# Patient Record
Sex: FEMALE | Race: OTHER | Employment: UNEMPLOYED | URBAN - METROPOLITAN AREA
[De-identification: names, ages, dates, MRNs, and addresses within clinical notes are randomized per-mention and may not be internally consistent; named-entity substitution may affect disease eponyms.]

---
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
## 2024-01-09 ENCOUNTER — TELEPHONE (OUTPATIENT)
Age: 19
End: 2024-01-09

## 2024-01-09 ENCOUNTER — INITIAL PRENATAL (OUTPATIENT)
Age: 19
End: 2024-01-09

## 2024-01-09 VITALS
DIASTOLIC BLOOD PRESSURE: 71 MMHG | SYSTOLIC BLOOD PRESSURE: 108 MMHG | TEMPERATURE: 98.4 F | HEIGHT: 67 IN | HEART RATE: 72 BPM | OXYGEN SATURATION: 100 % | BODY MASS INDEX: 24.33 KG/M2 | RESPIRATION RATE: 17 BRPM | WEIGHT: 155 LBS

## 2024-01-09 DIAGNOSIS — Z23 NEED FOR INFLUENZA VACCINATION: ICD-10-CM

## 2024-01-09 DIAGNOSIS — O21.0 HYPEREMESIS GRAVIDARUM: ICD-10-CM

## 2024-01-09 DIAGNOSIS — Z34.01 PRENATAL CARE, FIRST PREGNANCY IN FIRST TRIMESTER: Primary | ICD-10-CM

## 2024-01-09 DIAGNOSIS — O30.001 TWIN GESTATION IN FIRST TRIMESTER, UNSPECIFIED MULTIPLE GESTATION TYPE: ICD-10-CM

## 2024-01-09 PROCEDURE — 90674 CCIIV4 VAC NO PRSV 0.5 ML IM: CPT

## 2024-01-09 PROCEDURE — PBSHW INFLUENZA, FLUCELVAX, (AGE 6 MO+), IM, PF, 0.5 ML

## 2024-01-09 PROCEDURE — 0500F INITIAL PRENATAL CARE VISIT: CPT

## 2024-01-09 PROCEDURE — PBSHW PBB SHADOW CHARGE

## 2024-01-09 RX ORDER — DOXYLAMINE SUCCINATE AND PYRIDOXINE HYDROCHLORIDE, DELAYED RELEASE TABLETS 10 MG/10 MG 10; 10 MG/1; MG/1
10 TABLET, DELAYED RELEASE ORAL NIGHTLY
Qty: 60 TABLET | Refills: 0 | Status: SHIPPED | OUTPATIENT
Start: 2024-01-09 | End: 2024-01-09

## 2024-01-09 RX ORDER — ASPIRIN 81 MG/1
81 TABLET ORAL DAILY
Qty: 90 TABLET | Refills: 2 | Status: SHIPPED | OUTPATIENT
Start: 2024-01-09

## 2024-01-09 RX ORDER — PYRIDOXINE HCL (VITAMIN B6) 50 MG
25 TABLET ORAL DAILY
Qty: 30 TABLET | Refills: 1 | Status: SHIPPED | OUTPATIENT
Start: 2024-01-09 | End: 2025-01-08

## 2024-01-09 ASSESSMENT — PATIENT HEALTH QUESTIONNAIRE - PHQ9
SUM OF ALL RESPONSES TO PHQ QUESTIONS 1-9: 1
1. LITTLE INTEREST OR PLEASURE IN DOING THINGS: 1
SUM OF ALL RESPONSES TO PHQ QUESTIONS 1-9: 1
SUM OF ALL RESPONSES TO PHQ QUESTIONS 1-9: 1
SUM OF ALL RESPONSES TO PHQ9 QUESTIONS 1 & 2: 1
SUM OF ALL RESPONSES TO PHQ QUESTIONS 1-9: 1
2. FEELING DOWN, DEPRESSED OR HOPELESS: 0

## 2024-01-09 NOTE — PROGRESS NOTES
I reviewed with the resident the medical history and the resident's findings on the physical examination.  I discussed with the resident the patient's diagnosis and concur with the plan, with the following additions:     18 y.o.  at 15w2d by 11ws here for AUGUSTINE    BP Readings from Last 3 Encounters:   24 108/71   11/15/23 115/76   10/24/23 120/79       Wt Readings from Last 10 Encounters:   24 70.3 kg (155 lb) (84 %, Z= 0.99)*   11/15/23 77.1 kg (170 lb) (92 %, Z= 1.38)*   10/24/23 68 kg (150 lb) (80 %, Z= 0.85)*     * Growth percentiles are based on CDC (Girls, 2-20 Years) data.     POC Limited Obstetric US:  Presentation/Position: Breech, Breech  Viability: confirmed by FHT A - 153 BPM; B - 145 bpm  Gestation: twins  Placenta: anterior. Cannot exclude monochorionic. T-sign not clearly visualized.  Fluid: Adequate         #AUGUSTINE:   - Labs reviewed - hgb 13.3; Rh+ and ab screen neg; other labs pending.    - Last Pap: NI, too young  - Sonos reviewed.  TVUS showing twin IUP. Dates changed due to uncertain LMP  - Immunizations: flu today; Tdap at 28 weeks; Covid x2; Hep B immunity pending  - Indications for Aspirin: twin gestation  - Taking PNV.  - Anticipated mode of delivery: TBD  - Contraception plan: TBD    #Twin Gestation   - POC US today does not rule out monochoionicity. T-sign was not clearly seen.  - Scheduled with MFM for anatomy + consultation on  at 1:00pm (20w2d)  - if confirmed di-di, will need APT at 36 weeks and delivery from 38-38 6/7 weeks.    #Teen  - UDS collected today  - GC chalmydia neg. Other STI screening collected today, repeat in 3rd tri.  - Message sent to  for assistance.    Today: IOB labs. Start ASA. Scheduled w MFM.  To be done at next visit: review labs, MFM sono.        Follow up in 4 weeks.      30 minutes were spent on the day of this encounter both with the patient and in related activities including chart review, care coordination and counseling.

## 2024-01-09 NOTE — PROGRESS NOTES
St and last name and .    Session # [id number]   # [id number]    Chief Complaint   Patient presents with    Initial Prenatal Visit     . 20w 3d today. No bleeding or LOF.  - FM.        Vitals:    24 1406   BP: 108/71   Pulse: 72   Resp: 17   Temp: 98.4 °F (36.9 °C)   TempSrc: Temporal   SpO2: 100%   Weight: 70.3 kg (155 lb)   Height: 1.711 m (5' 7.35\")        1. Have you been to the ER, urgent care clinic since your last visit?  Hospitalized since your last visit?No    2. Have you seen or consulted any other health care providers outside of the [facility] since your last visit?  Include any pap smears or colon screening. No

## 2024-01-09 NOTE — PROGRESS NOTES
[address] [place] [address]   Office [phone], Fax [phone]  Subjective:   Valeri Kwon is a 18 y.o.  who is being seen today for her first obstetrical visit.    HPI: No bleeding/fluid discharge. White vaginal discharge. Having nausea/vomiting for past 3 months daily, 2-3 times per day. Reports only keeping down plantains and occasionally soups. Vomits with water intake as well. Remainder of ROS negative. Denies chest pain, fever/chills, abdominal pain, shortness of breath, headache, change in urine/bowel movement.    OB History  . No prior pregnancies, miscarriage, or abortions.  Confirmed viable IUP by TVUS on 23. Less than 7 day discrepancy.   She is at EGA 15w 2d by TVUS . Previously stated that LMP was on 23. Today states LMP was 23. GAEL 2024 by US.     First pos pregnancy test: 10/25/23 in emergency department.  This is a planned pregnancy. FOB is involved and present with the patient.  GYN Hx: Denies prior STI (g/c, herpes). Denies HIV.  See flow sheet for other OB history.    PMHx:   - No prior diagnosis. No previous hospitalizations. No psych hx.  - No regular medications other than prenatal vitamin. No allergies.   FHx: unsure.  SHx:  - Home: Apartment  - Work: No  - Financial stability: Supported by partner and family.  - Social support: Yes, good as above.  - Mood: Sentimental, Happy about pregnancy  - Smoking: Yes, vape; for 4 months, stopped in 2023. No tobacco or marijuana use.  - Alcohol use: Yes; started at age 15 and stopped in 2023.   - Drug use: No.    History of GDM or DM? No prior pregnancy.   History of GHTN or CHTN? No prior pregnancy.   History of pre-eclampsia? No prior pregnancy.   Taking prenatal vitamins? Yes, since 2023.    Allergies- reviewed:   No Known Allergies    Medications- reviewed:   Current Outpatient Medications   Medication Sig    Prenatal Vit-Fe Fumarate-FA (PRENATAL PO) Take by mouth

## 2024-01-09 NOTE — TELEPHONE ENCOUNTER
Received a call from Dr Allen to have patient scheduled for an ultrasound. He wanted to know if she should be seen sooner than the 20 wks bc she has twins or could she wait until her 20 wks. I got with the nurse and she stated that she would be fine coimg in at 20 weeks. The patient's anatomy scan is scheduled for 02/13 @ 1:00 pm. Dr Allen then called back and wanted the pt seen at 16 wks so that it could be determined if the pt is carrying di di or mono di twins. I called the pt via , spoke with the , who is on file for consent to schedule Friday's appointment. The pt is currently scheduled for Friday, January 12th at 1:45 pm at [facility].

## 2024-01-10 LAB
APPEARANCE UR: ABNORMAL
BACTERIA URNS QL MICRO: ABNORMAL /HPF
BILIRUB UR QL CFM: NEGATIVE
CAOX CRY URNS QL MICRO: ABNORMAL
COLOR UR: ABNORMAL
EPITH CASTS URNS QL MICRO: ABNORMAL /LPF
GLUCOSE UR STRIP.AUTO-MCNC: 100 MG/DL
HBV SURFACE AB SER QL: REACTIVE
HBV SURFACE AB SER-ACNC: 21.63 MIU/ML
HBV SURFACE AG SER QL: 0.24 INDEX
HBV SURFACE AG SER QL: NEGATIVE
HGB UR QL STRIP: NEGATIVE
HIV 1+2 AB+HIV1 P24 AG SERPL QL IA: NONREACTIVE
HIV 1/2 RESULT COMMENT: NORMAL
HYALINE CASTS URNS QL MICRO: >20 /LPF (ref 0–5)
KETONES UR QL STRIP.AUTO: ABNORMAL MG/DL
LEUKOCYTE ESTERASE UR QL STRIP.AUTO: ABNORMAL
NITRITE UR QL STRIP.AUTO: NEGATIVE
PH UR STRIP: 5.5 (ref 5–8)
PROT UR STRIP-MCNC: 100 MG/DL
RBC #/AREA URNS HPF: ABNORMAL /HPF (ref 0–5)
RPR SER QL: NONREACTIVE
RUBV IGG SERPL IA-ACNC: NORMAL IU/ML
SP GR UR REFRACTOMETRY: 1.03 (ref 1–1.03)
TSH SERPL DL<=0.05 MIU/L-ACNC: 0.34 UIU/ML (ref 0.36–3.74)
UROBILINOGEN UR QL STRIP.AUTO: 0.2 EU/DL (ref 0.2–1)
WBC URNS QL MICRO: ABNORMAL /HPF (ref 0–4)

## 2024-01-11 LAB
BACTERIA SPEC CULT: NORMAL
HBV CORE AB SERPL QL IA: NEGATIVE
SERVICE CMNT-IMP: NORMAL
VZV IGG SER IA-ACNC: 1414 INDEX

## 2024-01-12 ENCOUNTER — ROUTINE PRENATAL (OUTPATIENT)
Age: 19
End: 2024-01-12

## 2024-01-12 VITALS — SYSTOLIC BLOOD PRESSURE: 117 MMHG | HEART RATE: 81 BPM | DIASTOLIC BLOOD PRESSURE: 75 MMHG

## 2024-01-12 DIAGNOSIS — Z3A.16 16 WEEKS GESTATION OF PREGNANCY: ICD-10-CM

## 2024-01-12 DIAGNOSIS — O30.039 MONOCHORIONIC DIAMNIOTIC TWIN PREGNANCY WITH TWIN TO TWIN TRANSFUSION SYNDROME, ANTEPARTUM: Primary | ICD-10-CM

## 2024-01-12 DIAGNOSIS — O36.5922: ICD-10-CM

## 2024-01-12 DIAGNOSIS — O43.029 MONOCHORIONIC DIAMNIOTIC TWIN PREGNANCY WITH TWIN TO TWIN TRANSFUSION SYNDROME, ANTEPARTUM: Primary | ICD-10-CM

## 2024-01-12 DIAGNOSIS — O30.012 MONOCHORIONIC AND MONOAMNIOTIC TWIN GESTATION IN SECOND TRIMESTER, ANTEPARTUM: ICD-10-CM

## 2024-01-12 LAB
HGB A MFR BLD: 97.3 % (ref 96.4–98.8)
HGB A2 MFR BLD COLUMN CHROM: 2.7 % (ref 1.8–3.2)
HGB F MFR BLD: 0 % (ref 0–2)
HGB FRACT BLD-IMP: NORMAL
HGB S MFR BLD: 0 %

## 2024-01-12 PROCEDURE — 76817 TRANSVAGINAL US OBSTETRIC: CPT | Performed by: OBSTETRICS & GYNECOLOGY

## 2024-01-12 PROCEDURE — 76811 OB US DETAILED SNGL FETUS: CPT | Performed by: OBSTETRICS & GYNECOLOGY

## 2024-01-12 PROCEDURE — 76812 OB US DETAILED ADDL FETUS: CPT | Performed by: OBSTETRICS & GYNECOLOGY

## 2024-01-12 PROCEDURE — 99205 OFFICE O/P NEW HI 60 MIN: CPT | Performed by: OBSTETRICS & GYNECOLOGY

## 2024-01-12 NOTE — PROCEDURES
PATIENT: SERINA ZHANG   -  : 2005   -  DOS:2024   -  INTERPRETING PROVIDER:Alfonso Hagen,   Indication  ========    MO/DI v. Sinan Twin Gestation with possible TTTS v. selective FGR of B and velamentous UCI.    Method  ======    Transabdominal ultrasound examination. View: Good view    Dating  ======    LMP on: 2023  Cycle: regular cycle  GA by LMP 16 w + 3 d  GALE by LMP: 2024  Previous Ultrasound on: 2023  Type of prior assessment: GA  GA at prior assessment date 11 w + 2 d  GA by previous U/S 15 w + 5 d  GAEL by previous Ultrasound: 2024  Ultrasound examination on: 2024  GA by U/S based upon: AC, BPD, Femur, HC  GA by U/S 16 w + 1 d  GAEL by U/S: 2024  GA by U/S based upon (Fetus 2): AC, BPD, Femur, HC  GA by U/S (Fetus 2) 15 w + 0 d  GAEL by U/S (Fetus 2): 2024  Assigned: based on the LMP, selected on 2024  Assigned GA 16 w + 3 d  Assigned GAEL: 2024    Fetal Growth Overview  =================    Exam date        GA              BPD (mm)          HC (mm)              AC (mm)              FL (mm)             HL (mm)         EFW (g)  2024        16w 3d        33.2     41%        117.5    10%        106.6     56%        18.8    15%                             144    21%    Fetal Biometry  ============    Standard  BPD 33.2 mm 16w 2d 41% Hadlock  OFD 40.8 mm 16w 1d 41% Vimal  .5 mm 15w 6d 10% Hadlock  .6 mm 16w 4d 56% Hadlock  Femur 18.8 mm 15w 4d 15% Hadlock   g 16w 0d 21% Hadlock  EFW discordance 25.0 %  EFW (lb) 0 lb  EFW (oz) 5 oz  EFW by: Hadlock (BPD-HC-AC-FL)  Extended  Nasal bone 4.0 mm  Head / Face / Neck  Nasal bone: present  Other Structures   bpm    General Evaluation  ==============    Cardiac activity present.  bpm. Fetal movements: visualized. Presentation: Cephalic  Placenta: Placental site: Anterior. Placental edge-to-cervical os distance 2.1 cm  Umbilical cord: Cord vessels: 3 vessel cord. Insertion

## 2024-01-15 ENCOUNTER — TELEPHONE (OUTPATIENT)
Age: 19
End: 2024-01-15

## 2024-01-15 NOTE — TELEPHONE ENCOUNTER
----- Message from Gavin Cunningham sent at 1/15/2024  1:44 PM EST -----  Regarding: FW: Pt needs to be sent to [facility]  Are you able to call patient for me to inform her of her date and time of her appointment.    She will now be going to [facility] ([facility]) and will see Dr. Heavenly Ventura. Her appointment date and time will be  at 2:00pm.    If she ask this is different from her  CENTER appointment.    If that date and time does not work for her this is the number to [facility] [phone] office. April said its best that we help coordinate the transfer due to the population. You may can still provide her with the number just in case she needs to reach out to them.    Thank you so much for you help with this!  ----- Message -----  From: Kenton Allen MD  Sent: 1/15/2024  10:28 AM EST  To: Gavin Cunningham  Subject: Pt needs to be sent to [facility]               Garrick Alonso -    Could you (or someone) please help this pt set up an appointment with [facility]? We need to transfer her care to them because she has a very high risk twin pregnancy. She has an appointment with us , and we can keep that for now, but she can cancel it whenever her appointment is made. She should try to get an appointment in the next 2 weeks or so.    Thank you!    DO

## 2024-01-15 NOTE — TELEPHONE ENCOUNTER
Number on pt's file was pt's ; he informed this writer that the pt dos not have a phone. Pt's  was able to confirm pt's name, , and address. He was told to tell her to log on to [employer] to see new appt details and the message that was sent by this writer. He displayed understanding.     Thank you!

## 2024-01-17 ENCOUNTER — TELEPHONE (OUTPATIENT)
Age: 19
End: 2024-01-17

## 2024-01-17 NOTE — TELEPHONE ENCOUNTER
Spoke with Yahaira MAY at [facility]. Only candidates available for tl cases are [place] Residents. Dr. Payne notified and Dr. Aguilar (Family Medicine) sent perfect serve requesting their office help assisting patient with emergency medicaid.

## 2024-01-17 NOTE — TELEPHONE ENCOUNTER
Spoke with MFM at [facility] in regards to [place] Di Twins with possible TTTS and severe FGR in Twin B. Request by Dr. Payne to see if the TTTS Laser procedure can be done for a tl case (patient is currently not insured). Edwina Barajas for M is going to reach out to Yahaira MAY for Dr. Jose Guadalupe Estrella to relay the information. Contact and patient information provided.

## 2024-01-18 ENCOUNTER — TELEPHONE (OUTPATIENT)
Age: 19
End: 2024-01-18

## 2024-01-18 NOTE — TELEPHONE ENCOUNTER
[facility] Navigator attempted to reach patient to assist with Medicaid enrollment. Left voicemail for patient to contact SHAYNE.     DANTE Estesator

## 2024-01-22 ENCOUNTER — TELEPHONE (OUTPATIENT)
Age: 19
End: 2024-01-22

## 2024-01-22 ENCOUNTER — INITIAL PRENATAL (OUTPATIENT)
Age: 19
End: 2024-01-22

## 2024-01-22 VITALS — DIASTOLIC BLOOD PRESSURE: 78 MMHG | SYSTOLIC BLOOD PRESSURE: 118 MMHG | WEIGHT: 159 LBS | BODY MASS INDEX: 24.64 KG/M2

## 2024-01-22 DIAGNOSIS — R82.998 LEUKOCYTES IN URINE: ICD-10-CM

## 2024-01-22 DIAGNOSIS — Z34.00 SUPERVISION OF NORMAL FIRST PREGNANCY, ANTEPARTUM: Primary | ICD-10-CM

## 2024-01-22 PROCEDURE — 0502F SUBSEQUENT PRENATAL CARE: CPT | Performed by: STUDENT IN AN ORGANIZED HEALTH CARE EDUCATION/TRAINING PROGRAM

## 2024-01-22 RX ORDER — LIDOCAINE 50 MG/G
1 PATCH TOPICAL DAILY
Qty: 10 PATCH | Refills: 0 | Status: SHIPPED | OUTPATIENT
Start: 2024-01-22 | End: 2024-01-27 | Stop reason: HOSPADM

## 2024-01-22 NOTE — PROGRESS NOTES
17yo  at 17w6d here today as OB transfer. Mono/mono vs mono/di twin gestation. Met with MFM for scan : Baby A-EFW 144g; Baby B-EFW 108g. 25% discordance. Baby B's bladder not visualized. Concern for stage 2 TTTS given this finding. Reiterated these findings and recommendation that she receive evaluation at tertiary care center for consideration of fetal surgery. MFM office has made some calls to these centers and plans are pending.     Patient otherwise healthy, doing well. Feeling some flutters. FHR wnl x2 using fetal scan today. Will f/u in 1w after f/u MFM visit.

## 2024-01-22 NOTE — TELEPHONE ENCOUNTER
Left voicemail on confidential line to [facility] regards to [place] Di Twins with possible TTTS and severe FGR in Twin B. Request by Dr. Hagen to see if the TTTS Laser procedure can be done for a tl case (patient is currently not insured).

## 2024-01-24 ENCOUNTER — TELEPHONE (OUTPATIENT)
Age: 19
End: 2024-01-24

## 2024-01-24 LAB
AFP INTERP SERPL-IMP: ABNORMAL
AFP INTERP SERPL-IMP: ABNORMAL
AFP MOM SERPL: 5.66
AFP SERPL-MCNC: 210.6 NG/ML
AGE AT DELIVERY: 20.4 YR
COMMENT: ABNORMAL
GA METHOD: ABNORMAL
GA: 17 WEEKS
IDDM PATIENT QL: NO
Lab: ABNORMAL
MULTIPLE PREGNANCY: ABNORMAL
NEURAL TUBE DEFECT RISK FETUS: 51 %

## 2024-01-24 NOTE — TELEPHONE ENCOUNTER
Spoke with patients  Rasheed Lam in regards to emergency medicaid. The  (Ltaosha Chow) from [facility] has been trying to reach her in regards to initiating the process for medicaid. Gave Rasheed the number for Valeri to contact Latosha at [phone]. No further questions at this time.

## 2024-01-24 NOTE — TELEPHONE ENCOUNTER
Spoke with Jennifer from [facility] in regards to referral. She advised patient will need to have the [place] emergency medicaid [place] and the referral approved through medicaid before the patient can schedule an evaluation. Will contact Jennifer once medicaid is approved. [phone]

## 2024-01-25 ENCOUNTER — TELEPHONE (OUTPATIENT)
Age: 19
End: 2024-01-25

## 2024-01-25 NOTE — TELEPHONE ENCOUNTER
[facility] Navigator was able to make contact with patient regarding Medicaid enrollment.     Per patient, applied for Medicaid on 1/22/24, currently pending determination. Application #[id number].    SW to follow-up with [facility] regarding Medicaid status.    Patient advised to contact SW/clinic for any additional needs that she may have.      DANTE Estesator       They would need script today for her to hold the medication.  This is why she is asking provider on call.

## 2024-01-25 NOTE — TELEPHONE ENCOUNTER
Attempted to reach patient again to initial Medicaid enrollment process. No answer, left voicemail.     DANTE Estes Navigator

## 2024-01-26 ENCOUNTER — HOSPITAL ENCOUNTER (EMERGENCY)
Facility: HOSPITAL | Age: 19
Discharge: STILL ADMITTED OR ADMINISTRATIVE | DRG: 560 | End: 2024-01-26
Attending: STUDENT IN AN ORGANIZED HEALTH CARE EDUCATION/TRAINING PROGRAM
Payer: MEDICAID

## 2024-01-26 ENCOUNTER — HOSPITAL ENCOUNTER (INPATIENT)
Facility: HOSPITAL | Age: 19
LOS: 1 days | Discharge: HOME | DRG: 560 | End: 2024-01-27
Attending: OBSTETRICS & GYNECOLOGY | Admitting: OBSTETRICS & GYNECOLOGY
Payer: MEDICAID

## 2024-01-26 VITALS
SYSTOLIC BLOOD PRESSURE: 151 MMHG | RESPIRATION RATE: 23 BRPM | HEART RATE: 137 BPM | DIASTOLIC BLOOD PRESSURE: 95 MMHG | OXYGEN SATURATION: 97 %

## 2024-01-26 DIAGNOSIS — O20.0 THREATENED MISCARRIAGE: Primary | ICD-10-CM

## 2024-01-26 PROBLEM — Z3A.18 18 WEEKS GESTATION OF PREGNANCY: Status: ACTIVE | Noted: 2024-01-26

## 2024-01-26 PROBLEM — O42.90 PROM (PREMATURE RUPTURE OF MEMBRANES): Status: ACTIVE | Noted: 2024-01-26

## 2024-01-26 LAB
ABO + RH BLD: NORMAL
ALBUMIN SERPL-MCNC: 3.2 G/DL (ref 3.5–5)
ALBUMIN/GLOB SERPL: 0.8 (ref 1.1–2.2)
ALP SERPL-CCNC: 52 U/L (ref 45–117)
ALT SERPL-CCNC: 17 U/L (ref 12–78)
ANION GAP SERPL CALC-SCNC: 11 MMOL/L (ref 5–15)
AST SERPL-CCNC: 13 U/L (ref 15–37)
BASOPHILS # BLD: 0 K/UL (ref 0–0.1)
BASOPHILS NFR BLD: 0 % (ref 0–1)
BILIRUB SERPL-MCNC: 0.3 MG/DL (ref 0.2–1)
BLOOD GROUP ANTIBODIES SERPL: NORMAL
BUN SERPL-MCNC: 6 MG/DL (ref 6–20)
BUN/CREAT SERPL: 10 (ref 12–20)
CALCIUM SERPL-MCNC: 9.4 MG/DL (ref 8.5–10.1)
CHLORIDE SERPL-SCNC: 108 MMOL/L (ref 97–108)
CO2 SERPL-SCNC: 21 MMOL/L (ref 21–32)
COMMENT:: NORMAL
CREAT SERPL-MCNC: 0.61 MG/DL (ref 0.55–1.02)
DIFFERENTIAL METHOD BLD: ABNORMAL
EOSINOPHIL # BLD: 0.1 K/UL (ref 0–0.4)
EOSINOPHIL NFR BLD: 1 % (ref 0–7)
ERYTHROCYTE [DISTWIDTH] IN BLOOD BY AUTOMATED COUNT: 14.2 % (ref 11.5–14.5)
GLOBULIN SER CALC-MCNC: 4.2 G/DL (ref 2–4)
GLUCOSE SERPL-MCNC: 70 MG/DL (ref 65–100)
HCG SERPL-ACNC: ABNORMAL MIU/ML (ref 0–6)
HCT VFR BLD AUTO: 32.4 % (ref 35–47)
HGB BLD-MCNC: 11.7 G/DL (ref 11.5–16)
IMM GRANULOCYTES # BLD AUTO: 0 K/UL (ref 0–0.04)
IMM GRANULOCYTES NFR BLD AUTO: 0 % (ref 0–0.5)
LYMPHOCYTES # BLD: 2.3 K/UL (ref 0.8–3.5)
LYMPHOCYTES NFR BLD: 24 % (ref 12–49)
MCH RBC QN AUTO: 31.5 PG (ref 26–34)
MCHC RBC AUTO-ENTMCNC: 36.1 G/DL (ref 30–36.5)
MCV RBC AUTO: 87.1 FL (ref 80–99)
MONOCYTES # BLD: 1 K/UL (ref 0–1)
MONOCYTES NFR BLD: 11 % (ref 5–13)
NEUTS SEG # BLD: 6.1 K/UL (ref 1.8–8)
NEUTS SEG NFR BLD: 64 % (ref 32–75)
NRBC # BLD: 0 K/UL (ref 0–0.01)
NRBC BLD-RTO: 0 PER 100 WBC
PLATELET # BLD AUTO: 244 K/UL (ref 150–400)
PMV BLD AUTO: 10.4 FL (ref 8.9–12.9)
POTASSIUM SERPL-SCNC: 3.4 MMOL/L (ref 3.5–5.1)
PROT SERPL-MCNC: 7.4 G/DL (ref 6.4–8.2)
RBC # BLD AUTO: 3.72 M/UL (ref 3.8–5.2)
SODIUM SERPL-SCNC: 140 MMOL/L (ref 136–145)
SPECIMEN EXP DATE BLD: NORMAL
SPECIMEN HOLD: NORMAL
WBC # BLD AUTO: 9.4 K/UL (ref 3.6–11)

## 2024-01-26 PROCEDURE — 86900 BLOOD TYPING SEROLOGIC ABO: CPT

## 2024-01-26 PROCEDURE — 36415 COLL VENOUS BLD VENIPUNCTURE: CPT

## 2024-01-26 PROCEDURE — 85025 COMPLETE CBC W/AUTO DIFF WBC: CPT

## 2024-01-26 PROCEDURE — 99283 EMERGENCY DEPT VISIT LOW MDM: CPT

## 2024-01-26 PROCEDURE — 7210000100 HC LABOR FEE PER 1 HR

## 2024-01-26 PROCEDURE — 6360000002 HC RX W HCPCS: Performed by: STUDENT IN AN ORGANIZED HEALTH CARE EDUCATION/TRAINING PROGRAM

## 2024-01-26 PROCEDURE — 99222 1ST HOSP IP/OBS MODERATE 55: CPT | Performed by: OBSTETRICS & GYNECOLOGY

## 2024-01-26 PROCEDURE — 7220000102 HC DELIVERY VAGINAL/MULTIPLE

## 2024-01-26 PROCEDURE — 80053 COMPREHEN METABOLIC PANEL: CPT

## 2024-01-26 PROCEDURE — 84702 CHORIONIC GONADOTROPIN TEST: CPT

## 2024-01-26 PROCEDURE — 76815 OB US LIMITED FETUS(S): CPT | Performed by: OBSTETRICS & GYNECOLOGY

## 2024-01-26 PROCEDURE — 86901 BLOOD TYPING SEROLOGIC RH(D): CPT

## 2024-01-26 PROCEDURE — 2580000003 HC RX 258

## 2024-01-26 PROCEDURE — 6360000002 HC RX W HCPCS: Performed by: OBSTETRICS & GYNECOLOGY

## 2024-01-26 PROCEDURE — 1100000000 HC RM PRIVATE

## 2024-01-26 PROCEDURE — 6370000000 HC RX 637 (ALT 250 FOR IP): Performed by: OBSTETRICS & GYNECOLOGY

## 2024-01-26 PROCEDURE — 6360000002 HC RX W HCPCS

## 2024-01-26 PROCEDURE — 2580000003 HC RX 258: Performed by: OBSTETRICS & GYNECOLOGY

## 2024-01-26 PROCEDURE — 2580000003 HC RX 258: Performed by: STUDENT IN AN ORGANIZED HEALTH CARE EDUCATION/TRAINING PROGRAM

## 2024-01-26 PROCEDURE — 86850 RBC ANTIBODY SCREEN: CPT

## 2024-01-26 RX ORDER — ONDANSETRON 2 MG/ML
4 INJECTION INTRAMUSCULAR; INTRAVENOUS EVERY 6 HOURS PRN
Status: DISCONTINUED | OUTPATIENT
Start: 2024-01-26 | End: 2024-01-26 | Stop reason: SDUPTHER

## 2024-01-26 RX ORDER — NALOXONE HYDROCHLORIDE 0.4 MG/ML
INJECTION, SOLUTION INTRAMUSCULAR; INTRAVENOUS; SUBCUTANEOUS PRN
Status: DISCONTINUED | OUTPATIENT
Start: 2024-01-26 | End: 2024-01-27 | Stop reason: HOSPADM

## 2024-01-26 RX ORDER — DIPHENHYDRAMINE HYDROCHLORIDE 50 MG/ML
12.5 INJECTION INTRAMUSCULAR; INTRAVENOUS EVERY 4 HOURS PRN
Status: DISCONTINUED | OUTPATIENT
Start: 2024-01-26 | End: 2024-01-27 | Stop reason: HOSPADM

## 2024-01-26 RX ORDER — CARBOPROST TROMETHAMINE 250 UG/ML
250 INJECTION, SOLUTION INTRAMUSCULAR PRN
Status: DISCONTINUED | OUTPATIENT
Start: 2024-01-26 | End: 2024-01-27 | Stop reason: HOSPADM

## 2024-01-26 RX ORDER — ONDANSETRON 2 MG/ML
4 INJECTION INTRAMUSCULAR; INTRAVENOUS EVERY 6 HOURS PRN
Status: DISCONTINUED | OUTPATIENT
Start: 2024-01-26 | End: 2024-01-27 | Stop reason: HOSPADM

## 2024-01-26 RX ORDER — SODIUM CHLORIDE 9 MG/ML
25 INJECTION, SOLUTION INTRAVENOUS PRN
Status: DISCONTINUED | OUTPATIENT
Start: 2024-01-26 | End: 2024-01-27 | Stop reason: HOSPADM

## 2024-01-26 RX ORDER — IBUPROFEN 400 MG/1
800 TABLET ORAL EVERY 8 HOURS PRN
Status: DISCONTINUED | OUTPATIENT
Start: 2024-01-26 | End: 2024-01-27 | Stop reason: HOSPADM

## 2024-01-26 RX ORDER — ACETAMINOPHEN 325 MG/1
650 TABLET ORAL EVERY 4 HOURS PRN
Status: DISCONTINUED | OUTPATIENT
Start: 2024-01-26 | End: 2024-01-27 | Stop reason: HOSPADM

## 2024-01-26 RX ORDER — HYDROMORPHONE HYDROCHLORIDE 2 MG/ML
2 INJECTION, SOLUTION INTRAMUSCULAR; INTRAVENOUS; SUBCUTANEOUS EVERY 4 HOURS PRN
Status: DISCONTINUED | OUTPATIENT
Start: 2024-01-26 | End: 2024-01-27 | Stop reason: HOSPADM

## 2024-01-26 RX ORDER — SODIUM CHLORIDE, SODIUM LACTATE, POTASSIUM CHLORIDE, CALCIUM CHLORIDE 600; 310; 30; 20 MG/100ML; MG/100ML; MG/100ML; MG/100ML
INJECTION, SOLUTION INTRAVENOUS CONTINUOUS
Status: DISCONTINUED | OUTPATIENT
Start: 2024-01-26 | End: 2024-01-27 | Stop reason: HOSPADM

## 2024-01-26 RX ORDER — LIDOCAINE HCL/EPINEPHRINE/PF 2%-1:200K
VIAL (ML) INJECTION
Status: DISPENSED
Start: 2024-01-26 | End: 2024-01-27

## 2024-01-26 RX ORDER — DOCUSATE SODIUM 100 MG/1
100 CAPSULE, LIQUID FILLED ORAL 2 TIMES DAILY
Status: DISCONTINUED | OUTPATIENT
Start: 2024-01-26 | End: 2024-01-27 | Stop reason: HOSPADM

## 2024-01-26 RX ORDER — SODIUM CHLORIDE 0.9 % (FLUSH) 0.9 %
5-40 SYRINGE (ML) INJECTION EVERY 12 HOURS SCHEDULED
Status: DISCONTINUED | OUTPATIENT
Start: 2024-01-26 | End: 2024-01-27 | Stop reason: HOSPADM

## 2024-01-26 RX ORDER — SODIUM CHLORIDE 0.9 % (FLUSH) 0.9 %
5-40 SYRINGE (ML) INJECTION PRN
Status: DISCONTINUED | OUTPATIENT
Start: 2024-01-26 | End: 2024-01-27 | Stop reason: HOSPADM

## 2024-01-26 RX ORDER — METHYLERGONOVINE MALEATE 0.2 MG/ML
200 INJECTION INTRAVENOUS PRN
Status: DISCONTINUED | OUTPATIENT
Start: 2024-01-26 | End: 2024-01-27 | Stop reason: HOSPADM

## 2024-01-26 RX ORDER — HYDROCODONE BITARTRATE AND ACETAMINOPHEN 5; 325 MG/1; MG/1
1 TABLET ORAL EVERY 4 HOURS PRN
Status: DISCONTINUED | OUTPATIENT
Start: 2024-01-26 | End: 2024-01-27 | Stop reason: HOSPADM

## 2024-01-26 RX ORDER — SODIUM CHLORIDE, SODIUM LACTATE, POTASSIUM CHLORIDE, AND CALCIUM CHLORIDE .6; .31; .03; .02 G/100ML; G/100ML; G/100ML; G/100ML
1000 INJECTION, SOLUTION INTRAVENOUS PRN
Status: DISCONTINUED | OUTPATIENT
Start: 2024-01-26 | End: 2024-01-27 | Stop reason: HOSPADM

## 2024-01-26 RX ORDER — SODIUM CHLORIDE 9 MG/ML
INJECTION, SOLUTION INTRAVENOUS PRN
Status: DISCONTINUED | OUTPATIENT
Start: 2024-01-26 | End: 2024-01-26

## 2024-01-26 RX ORDER — HYDROCODONE BITARTRATE AND ACETAMINOPHEN 5; 325 MG/1; MG/1
2 TABLET ORAL EVERY 4 HOURS PRN
Status: DISCONTINUED | OUTPATIENT
Start: 2024-01-26 | End: 2024-01-27 | Stop reason: HOSPADM

## 2024-01-26 RX ORDER — FENTANYL 0.2 MG/100ML-BUPIV 0.125%-NACL 0.9% EPIDURAL INJ 2/0.125 MCG/ML-%
1-15 SOLUTION INJECTION CONTINUOUS
Status: DISCONTINUED | OUTPATIENT
Start: 2024-01-26 | End: 2024-01-27 | Stop reason: HOSPADM

## 2024-01-26 RX ORDER — MISOPROSTOL 200 UG/1
400 TABLET ORAL EVERY 6 HOURS SCHEDULED
Status: DISCONTINUED | OUTPATIENT
Start: 2024-01-26 | End: 2024-01-27 | Stop reason: HOSPADM

## 2024-01-26 RX ORDER — MISOPROSTOL 200 UG/1
800 TABLET ORAL PRN
Status: DISCONTINUED | OUTPATIENT
Start: 2024-01-26 | End: 2024-01-27 | Stop reason: HOSPADM

## 2024-01-26 RX ORDER — SODIUM CHLORIDE, SODIUM LACTATE, POTASSIUM CHLORIDE, AND CALCIUM CHLORIDE .6; .31; .03; .02 G/100ML; G/100ML; G/100ML; G/100ML
500 INJECTION, SOLUTION INTRAVENOUS PRN
Status: DISCONTINUED | OUTPATIENT
Start: 2024-01-26 | End: 2024-01-27 | Stop reason: HOSPADM

## 2024-01-26 RX ORDER — EPHEDRINE SULFATE 50 MG/ML
INJECTION INTRAVENOUS
Status: DISCONTINUED
Start: 2024-01-26 | End: 2024-01-27 | Stop reason: WASHOUT

## 2024-01-26 RX ORDER — ONDANSETRON 4 MG/1
4 TABLET, ORALLY DISINTEGRATING ORAL EVERY 8 HOURS PRN
Status: DISCONTINUED | OUTPATIENT
Start: 2024-01-26 | End: 2024-01-27 | Stop reason: HOSPADM

## 2024-01-26 RX ORDER — MISOPROSTOL 200 UG/1
800 TABLET ORAL ONCE
Status: COMPLETED | OUTPATIENT
Start: 2024-01-26 | End: 2024-01-26

## 2024-01-26 RX ADMIN — MISOPROSTOL 800 MCG: 200 TABLET ORAL at 17:08

## 2024-01-26 RX ADMIN — SODIUM CHLORIDE, POTASSIUM CHLORIDE, SODIUM LACTATE AND CALCIUM CHLORIDE: 600; 310; 30; 20 INJECTION, SOLUTION INTRAVENOUS at 11:15

## 2024-01-26 RX ADMIN — HYDROMORPHONE HYDROCHLORIDE 2 MG: 2 INJECTION, SOLUTION INTRAMUSCULAR; INTRAVENOUS; SUBCUTANEOUS at 11:11

## 2024-01-26 RX ADMIN — HYDROMORPHONE HYDROCHLORIDE 2 MG: 2 INJECTION, SOLUTION INTRAMUSCULAR; INTRAVENOUS; SUBCUTANEOUS at 02:26

## 2024-01-26 RX ADMIN — Medication 166.7 ML: at 17:56

## 2024-01-26 RX ADMIN — ONDANSETRON 4 MG: 2 INJECTION INTRAMUSCULAR; INTRAVENOUS at 04:56

## 2024-01-26 RX ADMIN — OXYTOCIN 166.7 ML: 10 INJECTION, SOLUTION INTRAMUSCULAR; INTRAVENOUS at 17:56

## 2024-01-26 RX ADMIN — FENTANYL CITRATE 10 ML/HR: 0.05 INJECTION, SOLUTION INTRAMUSCULAR; INTRAVENOUS at 14:09

## 2024-01-26 RX ADMIN — ONDANSETRON 4 MG: 2 INJECTION INTRAMUSCULAR; INTRAVENOUS at 11:31

## 2024-01-26 RX ADMIN — AMPICILLIN SODIUM 2000 MG: 2 INJECTION, POWDER, FOR SOLUTION INTRAVENOUS at 17:44

## 2024-01-26 RX ADMIN — OXYTOCIN 87.3 MILLI-UNITS/MIN: 10 INJECTION, SOLUTION INTRAMUSCULAR; INTRAVENOUS at 18:08

## 2024-01-26 RX ADMIN — MISOPROSTOL 400 MCG: 200 TABLET ORAL at 16:07

## 2024-01-26 RX ADMIN — AMPICILLIN SODIUM 2000 MG: 2 INJECTION, POWDER, FOR SOLUTION INTRAVENOUS at 23:55

## 2024-01-26 RX ADMIN — IBUPROFEN 800 MG: 400 TABLET, FILM COATED ORAL at 23:56

## 2024-01-26 RX ADMIN — MISOPROSTOL 400 MCG: 200 TABLET ORAL at 11:15

## 2024-01-26 ASSESSMENT — PAIN SCALES - GENERAL
PAINLEVEL_OUTOF10: 8
PAINLEVEL_OUTOF10: 5

## 2024-01-26 ASSESSMENT — PAIN DESCRIPTION - ORIENTATION: ORIENTATION: LOWER

## 2024-01-26 ASSESSMENT — PAIN DESCRIPTION - LOCATION
LOCATION: ABDOMEN
LOCATION: ABDOMEN;BACK

## 2024-01-26 ASSESSMENT — PAIN DESCRIPTION - DESCRIPTORS: DESCRIPTORS: ACHING

## 2024-01-26 NOTE — PROGRESS NOTES
Consulted with RN regarding a care support visit with patient, RN checked with patient and father of babies, they did not want a visit at this time. RN will page  when and if they are ready for a visit from .       Chaplain Mele, MDiv, [facility]  Spiritual Health Services  Paging service: [phone] ([facility])

## 2024-01-26 NOTE — PROGRESS NOTES
0110-Patient to LDR#1 from ED transported by L&D staff along with DR. Leonard. Client presented to the ED twin pregnancy with active bleeding. IV access placed in right and left ante cube by ED staff. Client was assessed by Dr. Leonard in the ED prior to coming to L&D. Client only speaks Occitan and her boyfriend and family member speaks english. Talking with boyfriend it has been determined that client is 18weeks.   0113-DR. Leonard talking with client via language line to assess what happened and explain to her what she will  be doing during her exam.    0120-Vaginal speculum exam performed. Lots of clots noted, SVE performed 1/50  0128-ultrasound performed by Dr. Leonard   0134-Dr. Leonard discussing plan of care with patient. Opportunity for questons given(still utilizing the language line). All questions answered. Dr. Leonard explained to client and her boyfriend her findings during the ultrasound and client's options for POC for the day. Will given client and boyfriend time to discuss what they may want to do. Dr. Leonard will reassess client in a few hours.  0200-Famliy at bedside visting client.

## 2024-01-26 NOTE — PROGRESS NOTES
Labor Progress Note  Patient seen, fetal heart rate and contraction pattern evaluated.   Got Epidural around 2 pm  Feels rectal pressure    VSS AF    Physical Exam:  Cervical Exam: 1 cm dilated, ballooned DON  Membranes:  Intact  Bladder to be emptied    Yasmine Guillory MD

## 2024-01-26 NOTE — PROGRESS NOTES
This patient was 30 or more weeks gestation at the time of [facility] go-live.   For complete information pertaining to this patient's pregnancy, please refer to the paper chart and ACOG form.Delivery Note    Obstetrician:  Yasmine Guillory MD    Assistant: none    Pre-Delivery Diagnosis:  [place]-Di Twin IUFD @ 18 weeks 3 days    Post-Delivery Diagnosis: Stillborn infant(s) and Male    Intrapartum Event: None    Procedure: Spontaneous vaginal delivery    Epidural: YES    Monitor:  None    Indications for instrumental delivery: none    Estimated Blood Loss: No data found    Episiotomy: none    Laceration(s):  none    Laceration(s) repair: NO    Presentation: Cephalic    Fetal Description: multiple gestation 2    Fetal Position: Occiput Anterior    Birth Weight:     Birth Length:     Apgar - One Minute: 0    Apgar - Five Minutes: 0    Umbilical Cord:     Specimens: PLacenta           Complications:  none           Cord Blood Results:   Information for the patient's :  DAMIAN Kwon [[id number]]   No results found for: \"PCTDIG\", \"BILI\"   Information for the patient's :  DAMIAN Kwon [[id number]]   No results found for: \"PCTDIG\", \"BILI\"   Prenatal Labs:     Lab Results   Component Value Date/Time    ABORH B POSITIVE 2024 01:00 AM        Attending Attestation: I performed the procedure.    Signed By:  Yasmine Guillory MD     2024

## 2024-01-26 NOTE — PROGRESS NOTES
Spiritual Care Assessment/Progress Note  [facility]    Name: Valeri Kwon MRN: [id number]    Age: 19 y.o.     Sex: female   Language: Persian     Date: 2024            Total Time Calculated: 20 min              Spiritual Assessment begun in [facility] LABOR & DELIVERY  Service Provided For:: Patient and family together  Referral/Consult From:: Nurse  Encounter Overview/Reason : Initial Encounter    Spiritual beliefs:      [x] Involved in a renee tradition/spiritual practice:      [] Supported by a renee community:      [] Claims no spiritual orientation:      [] Seeking spiritual identity:           [] Adheres to an individual form of spirituality:      [] Not able to assess:                Identified resources for coping and support system:   Support System: Parent, Significant other       [] Prayer                  [] Devotional reading               [] Music                  [] Guided Imagery     [] Pet visits                                        [] Other: (COMMENT)     Specific area/focus of visit   Encounter:    Crisis: Type: Emotional distress  Spiritual/Emotional needs:    Ritual, Rites and Sacraments:    Grief, Loss, and Adjustments: Type:  loss/ Death,   Ethics/Mediation:    Behavioral Health:    Palliative Care:    Advance Care Planning:           Narrative:  initiated visit to Valeri Kwon in [facility] LABOR & DELIVERY due to nurse referral. Consulted with nurse.  services were used. Family was present. Patient was experiencing spiritual distress as evidenced by her sadness. She shared that she would appreciate a compassionate presence.  Provided ministry of presence and a moment of silence. They come from the [place] [employer] tradition. Family expressed interest in prayer, but ultimately decided they did not want one at this time. Patient and family is aware of 's availability. For additional spiritual care, please contact the  on-call at

## 2024-01-26 NOTE — PROGRESS NOTES
0140: Pt and  to discuss options at this time. Will call out to RN when ready.     0730:Bedside shift change report given to STEVEN Holland RN (oncoming nurse) by GÓMEZ Lugo RN (offgoing nurse). Report included the following information Nurse Handoff Report, Adult Overview, Intake/Output, MAR, and Recent Results.

## 2024-01-26 NOTE — CONSULTS
MATERNAL FETAL MEDICINE  INPATIENT CONSULTATION    REQUESTED BY: Jennifer Leonard MD   DATE OF CONSULT: 24    REASON FOR CONSULTATION: TTTS/LOF/VB     Scarlet #[id number] was used to conduct this visit.    Valeri Kwon is a 19 y.o.  at 18w3d admitted for TTTS/PPROM. +FHTs x2 on admission bedside sono early this AM.      Patient Active Problem List   Diagnosis    18 weeks gestation of pregnancy       History reviewed. No pertinent past medical history.    History reviewed. No pertinent surgical history.    OB History    Para Term  AB Living   1             SAB IAB Ectopic Molar Multiple Live Births                    # Outcome Date GA Lbr Amado/2nd Weight Sex Delivery Anes PTL Lv   1 Current                No Known Allergies      Current Facility-Administered Medications:     HYDROmorphone HCl PF (DILAUDID) injection 2 mg, 2 mg, IntraVENous, Q4H PRN, Jennifer Leonard MD, 2 mg at 24 0226    lactated ringers IV soln infusion, , IntraVENous, Continuous, Jennifer Leonard MD    ondansetron (ZOFRAN-ODT) disintegrating tablet 4 mg, 4 mg, Oral, Q8H PRN **OR** ondansetron (ZOFRAN) injection 4 mg, 4 mg, IntraVENous, Q6H PRN, Jennifer Leonard MD, 4 mg at 24 0456    Social History     Tobacco Use    Smoking status: Never     Passive exposure: Never    Smokeless tobacco: Never   Vaping Use    Vaping Use: Former    Substances: Flavoring   Substance Use Topics    Alcohol use: Not Currently    Drug use: Never       History reviewed. No pertinent family history.    /69   Pulse 92   Temp 97.9 °F (36.6 °C) (Oral)   Resp 14   LMP 2023   SpO2 99%     Gen: Comfortable, NAD  Resp: Comfortable respirations  CV: Well perfused  Ext: Moving all extremities well  Neuro: Alert, interactive, appropriate    Recent Results (from the past 24 hour(s))   CBC with Auto Differential    Collection Time: 24  1:00 AM   Result Value Ref Range    WBC 9.4 3.6 - 11.0 K/uL    RBC 3.72 (L)  3.80 - 5.20 M/uL    Hemoglobin 11.7 11.5 - 16.0 g/dL    Hematocrit 32.4 (L) 35.0 - 47.0 %    MCV 87.1 80.0 - 99.0 FL    MCH 31.5 26.0 - 34.0 PG    MCHC 36.1 30.0 - 36.5 g/dL    RDW 14.2 11.5 - 14.5 %    Platelets 244 150 - 400 K/uL    MPV 10.4 8.9 - 12.9 FL    Nucleated RBCs 0.0 0  WBC    nRBC 0.00 0.00 - 0.01 K/uL    Neutrophils % 64 32 - 75 %    Lymphocytes % 24 12 - 49 %    Monocytes % 11 5 - 13 %    Eosinophils % 1 0 - 7 %    Basophils % 0 0 - 1 %    Immature Granulocytes 0 0.0 - 0.5 %    Neutrophils Absolute 6.1 1.8 - 8.0 K/UL    Lymphocytes Absolute 2.3 0.8 - 3.5 K/UL    Monocytes Absolute 1.0 0.0 - 1.0 K/UL    Eosinophils Absolute 0.1 0.0 - 0.4 K/UL    Basophils Absolute 0.0 0.0 - 0.1 K/UL    Absolute Immature Granulocyte 0.0 0.00 - 0.04 K/UL    Differential Type AUTOMATED     CMP    Collection Time: 01/26/24  1:00 AM   Result Value Ref Range    Sodium 140 136 - 145 mmol/L    Potassium 3.4 (L) 3.5 - 5.1 mmol/L    Chloride 108 97 - 108 mmol/L    CO2 21 21 - 32 mmol/L    Anion Gap 11 5 - 15 mmol/L    Glucose 70 65 - 100 mg/dL    BUN 6 6 - 20 MG/DL    Creatinine 0.61 0.55 - 1.02 MG/DL    Bun/Cre Ratio 10 (L) 12 - 20      Est, Glom Filt Rate >60 >60 ml/min/1.73m2    Calcium 9.4 8.5 - 10.1 MG/DL    Total Bilirubin 0.3 0.2 - 1.0 MG/DL    ALT 17 12 - 78 U/L    AST 13 (L) 15 - 37 U/L    Alk Phosphatase 52 45 - 117 U/L    Total Protein 7.4 6.4 - 8.2 g/dL    Albumin 3.2 (L) 3.5 - 5.0 g/dL    Globulin 4.2 (H) 2.0 - 4.0 g/dL    Albumin/Globulin Ratio 0.8 (L) 1.1 - 2.2     TYPE AND SCREEN    Collection Time: 01/26/24  1:00 AM   Result Value Ref Range    Crossmatch expiration date 01/29/2024,4284     ABO/Rh B POSITIVE     Antibody Screen NEG    HCG, Quantitative, Pregnancy    Collection Time: 01/26/24  1:00 AM   Result Value Ref Range    hCG Quant >200,000 (H) 0 - 6 MIU/ML   Extra Tubes Hold    Collection Time: 01/26/24  1:00 AM   Result Value Ref Range    Specimen HOld 1drkgrn 1red 1discharge     Comment:

## 2024-01-26 NOTE — ED PROVIDER NOTES
[facility] EMERGENCY DEP  EMERGENCY DEPARTMENT ENCOUNTER      Pt Name: Valeri Kwon  MRN: [id number]  Birthdate 2005  Date of evaluation: 2024  Provider: Avi Mays DO    CHIEF COMPLAINT       Chief Complaint   Patient presents with    Vaginal Bleeding         HISTORY OF PRESENT ILLNESS   (Location/Symptom, Timing/Onset, Context/Setting, Quality, Duration, Modifying Factors, Severity)  Note limiting factors.   19 y.o. female presented today with abd pain, vaginal bleeding in setting of pregnancy. Initially told by  she was 5 months pregnant however after further evaluation/use of  she is due in  and is approx 18wks with twins, . Tonight went to the restroom and started with severe pain and bleeding.             Review of External Medical Records:     Nursing Notes were reviewed.    REVIEW OF SYSTEMS    (2-9 systems for level 4, 10 or more for level 5)     Review of Systems   Genitourinary:  Positive for vaginal bleeding.       Except as noted above the remainder of the review of systems was reviewed and negative.       PAST MEDICAL HISTORY   No past medical history on file.      SURGICAL HISTORY     No past surgical history on file.      CURRENT MEDICATIONS       Discharge Medication List as of 2024  1:11 AM        CONTINUE these medications which have NOT CHANGED    Details   Prenatal Vit-Fe Fumarate-FA (PRENATAL PO) Take by mouthHistorical Med      aspirin 81 MG EC tablet Take 1 tablet by mouth daily, Disp-90 tablet, R-2Normal      pyridoxine (RA VITAMIN B-6) 50 MG tablet Take 0.5 tablets by mouth daily, Disp-30 tablet, R-1Normal      doxyLAMINE succinate (GNP SLEEP AID) 25 MG tablet Take 0.5 tablets by mouth nightly, Disp-14 tablet, R-2Normal      lidocaine (LIDODERM) 5 % Place 1 patch onto the skin daily for 10 days 12 hours on, 12 hours off., Disp-10 patch, R-0Normal             ALLERGIES     Patient has no known allergies.    FAMILY HISTORY     No family

## 2024-01-26 NOTE — PROGRESS NOTES
730- Bedside shift change report given to Grisel Holland RN (oncoming nurse) by Chen Lugo RN (offgoing nurse). Report included the following information Nurse Handoff Report, Index, Intake/Output, MAR, and Recent Results.      09- Valeri taken to [facility] by wheelchair. Her partner is with her.     1005- Valeri has returned from [facility]. Partner at bedside.    1215- Spoke with , Lola Felix. Pt declines speaking to  at this time.    1325- Pt is having painful contractions. Requesting epidural.     1346- Dr Lugo at bedside for epidural placement.    1411- Epidural placement complete.    1447- Pt reports feeling rectal pressure. Dr Guillory at bedside. SVE 1cm    1705-  of Baby A. Dr Guillory at bedside.    1711-  of Baby B    1757-  at bedside offering support.    - Bedside shift change report given to Jami Kamara RN (oncoming nurse) by Grisel Holland RN (offgoing nurse). Report included the following information Nurse Handoff Report, Intake/Output, and MAR.

## 2024-01-26 NOTE — ED TRIAGE NOTES
I was told there was a lady delivering in the parking lot. On arrival there was a lady speaking only Swedish. Her  says she is 5 weeks pregnant with twins. She said she could not stand. She was assisted to an ED stretcher. She was taken to ED4. Code delivery was called. Her clothing was removed and she was bleeding heavily with large clots. OB, OB hospitalist and nurses arrived. She is now being taken to L&D.

## 2024-01-26 NOTE — PROCEDURES
(normal)  Diaphragm: normal  Cord insertion: normal  Stomach: normal  Kidneys: NOT VISUALIZED  Bladder: normal  Genitals: normal  Abdomen  Rt renal artery: NOT VISUALIZED  Lt renal artery: NOT VISUALIZED  Cervical spine: normal  Thoracic spine: normal  Lumbar spine: normal  Sacral spine: normal  Rt arm: normal  Lt arm: normal  Rt hand: normal  Rt fingers: NOT VISUALIZED  Lt hand: normal  Lt fingers: NOT VISUALIZED  Rt leg: normal  Lt leg: normal  Rt foot: SUBOPTIMAL  Rt toes: SUBOPTIMAL  Lt foot: SUBOPTIMAL  Lt toes: SUBOPTIMAL  Wants to know fetal sex: no    General Evaluation  ==============    Cardiac activity absent. FHR 0 bpm. Fetal movements: visualized. Presentation: Cephalic  Placenta: Placental site: Anterior  Umbilical cord: Cord vessels: 3 vessel cord  Amniotic fluid: Amount of AF: anydramnios. MVP 0.0 cm    Fetal Anatomy  ===========    Cranium: normal  Lateral ventricles: NOT VISUALIZED  Choroid plexus: normal  Midline falx: normal  Cavum septi pellucidi: normal  Cerebellum: NOT VISUALIZED  Cisterna magna: NOT VISUALIZED  Head / Neck  Neck: NOT VISUALIZED  Lips: NOT VISUALIZED  Profile: SUBOPTIMAL  Nose: NOT VISUALIZED  Face  Coronal face: NOT VISUALIZED  Nasal bone: NOT VISUALIZED  Palate: NOT VISUALIZED  Maxilla: NOT VISUALIZED  Mandible: NOT VISUALIZED  Orbits: NOT VISUALIZED  4-chamber view: NOT VISUALIZED  RVOT view: NOT VISUALIZED  LVOT view: NOT VISUALIZED  3-vessel view: NOT VISUALIZED  3-vessel-trachea view: NOT VISUALIZED  Heart / Thorax  Situs: situs solitus (normal)  Aortic arch view: NOT VISUALIZED  Bicaval view: NOT VISUALIZED  Ductal arch view: NOT VISUALIZED  Interventricular septum: NOT VISUALIZED  Cardiac rhythm: regular (normal)  Diaphragm: normal  Cord insertion: normal  Stomach: normal  Kidneys: NOT VISUALIZED  Bladder: NOT VISUALIZED  Genitals: normal  Abdomen  Rt renal artery: NOT VISUALIZED  Lt renal artery: NOT VISUALIZED  Cervical spine: normal  Thoracic spine: normal  Lumbar  spine: normal  Sacral spine: normal  Rt arm: NOT VISUALIZED  Lt arm: NOT VISUALIZED  Rt hand: NOT VISUALIZED  Rt fingers: NOT VISUALIZED  Lt hand: NOT VISUALIZED  Lt fingers: NOT VISUALIZED  Rt leg: NOT VISUALIZED  Lt leg: NOT VISUALIZED  Rt foot: NOT VISUALIZED  Rt toes: NOT VISUALIZED  Lt foot: NOT VISUALIZED  Lt toes: NOT VISUALIZED  Wants to know fetal sex: no    Findings  =======    Non-viable Monochorionic-diamniotic twin pregnancy at 18w 3d.    Fetus A: Cephalic presentation.  Cardiac activity is absent. MVP is 0 cm.  Anatomy visualized as stated above.  Placental site is Anterior.    Fetus B: Cephalic presentation.  Cardiac activity is absent. MVP is 0 cm.  Anatomy visualized as stated above.  Placental site is Anterior.    Plan of Care  ==========    Please see note in EPIC    Follow-up  ========    Begin IOL    Coding  ======    Code: 64219  Description: Ultrasound, pregnant uterus, real time with image documentation, limited one or more fetuses

## 2024-01-26 NOTE — H&P
Department of Obstetrics and Gynecology  Attending Obstetrics History and Physical        CHIEF COMPLAINT:  vaginal bleeding    HISTORY OF PRESENT ILLNESS:      The patient is a 19 y.o.  @ 18w3d, twin gestation, who presents from home with complaint of vaginal bleeding. She arrived to ED and a code delivery was called.    Patient does not speak English. Once we were able to get an  partner states she was at home and felt like she needed to poop, went to the bathroom and had a large gush of fluid and bleeding followed. The called 911 but then decided they could bring her here quicker. She is feeling some intermittent pain.    She started prenatal care with [facility][note 3] but was then recently transferred to [facility] because of concern for TTTS.    Pt denies any health problems or surgeries. She's here with her partner and stepsister.    OB History    Para Term  AB Living   1             SAB IAB Ectopic Molar Multiple Live Births                    # Outcome Date GA Lbr Amado/2nd Weight Sex Delivery Anes PTL Lv   1 Current              Past Medical History:    No past medical history on file.  Past Surgical History:    No past surgical history on file.  Social History:    TOBACCO:   reports that she has never smoked. She has never been exposed to tobacco smoke. She has never used smokeless tobacco.  ETOH:   reports that she does not currently use alcohol.  DRUGS:   reports no history of drug use.  Family History:   No family history on file.  Medications Prior to Admission:  Medications Prior to Admission: lidocaine (LIDODERM) 5 %, Place 1 patch onto the skin daily for 10 days 12 hours on, 12 hours off.  Prenatal Vit-Fe Fumarate-FA (PRENATAL PO), Take by mouth  aspirin 81 MG EC tablet, Take 1 tablet by mouth daily  pyridoxine (RA VITAMIN B-6) 50 MG tablet, Take 0.5 tablets by mouth daily  doxyLAMINE succinate (GNP SLEEP AID) 25 MG tablet, Take 0.5 tablets by mouth  nightly  Allergies:  Patient has no known allergies.    REVIEW OF SYSTEMS:    Negative except HPI    PHYSICAL EXAM:    Vitals:    24 0122   BP: (!) 136/94   Pulse: (!) 112   Temp: 98.3 °F (36.8 °C)        General appearance:  awake, alert, cooperative, appears scared  Lungs:  normal respiratory effort  Heart:  tachycardic with regular rhythm  Abdomen:  soft, NT, fundus at umbilicus    On sterile spec exam there was about 300 cc of clot in the vagina, minimal active bleeding noted    Cervix on digital exam 1 cm/50/high      On bedside ultrasound: there is essentially no fluid, FHR present and normal x2, anterior placenta, baby B is very small and pressed into the fundal region of the uterus      MFM U/S : Mo Mo v. Mo Di Twin living intrauterine pregnancy at 16 weeks 3 days. Adequate interval fetal growth for A. B: severe FGR. There is a 25% discordance between the two. We did not see a  amniotic membrane. However, fetus B showed decreased mobility suggestive of oligohydramnios in sac B, if this is a mono chorionic and diamniotic twin gestation. We did not see the urinary bladder of fetus B. 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 The early onset of this abnormality does not knvg well about the pregnancy course and outcome.    ASSESSMENT AND PLAN:    The patient is a 19 y.o.  @ 18w3d  PPROM  Vaginal bleeding - stable  Mono/mono with TTTS vs mono/di twins with severe oligo of B; severe growth restriction of B    I explained to the patient and partner through  that although the babies are alive, the prognosis is extremely poor. I explained that if she is in labor we don't stop labor at this gestational age and if the babies are born now they will with certainty die after birth. I explained that even if she is not in labor with her water being broken this early the prognosis is very poor and the babies are not likely to survive and if they do will

## 2024-01-26 NOTE — PROGRESS NOTES
OB Hospitalist    Introduced myself to Ms Kwon and her spouse and family at the bedside.  Condolences offered  Pt coping very  well  19 y.o.  at 18w3d with TTTS/PPROM/demise of both twins.   States feeling abdominal Cramps 8/10 in severity  Offered pain meds and discussed pain management including Epidural  Will initiate IOL with Buccal cytotec 400 mcg every 6 hrs  R/B/A discussed in detail all questions about induction with Cytotec (Buccal )including but not limited to bleeding, infection, retained placenta requiring D &C   lines were used  All questions answered  Pt feels that she is ready to get started with IOL  Excellent family support          Yasmine Guillory MD

## 2024-01-27 VITALS
HEART RATE: 72 BPM | SYSTOLIC BLOOD PRESSURE: 106 MMHG | OXYGEN SATURATION: 99 % | DIASTOLIC BLOOD PRESSURE: 63 MMHG | RESPIRATION RATE: 16 BRPM | TEMPERATURE: 97.8 F

## 2024-01-27 PROBLEM — O36.4XX0 FETAL DEMISE AFFECTING DELIVERY: Status: ACTIVE | Noted: 2024-01-27

## 2024-01-27 LAB
BASOPHILS # BLD: 0 K/UL (ref 0–0.1)
BASOPHILS NFR BLD: 0 % (ref 0–1)
DIFFERENTIAL METHOD BLD: ABNORMAL
EOSINOPHIL # BLD: 0.1 K/UL (ref 0–0.4)
EOSINOPHIL NFR BLD: 1 % (ref 0–7)
ERYTHROCYTE [DISTWIDTH] IN BLOOD BY AUTOMATED COUNT: 14.4 % (ref 11.5–14.5)
HCT VFR BLD AUTO: 20 % (ref 35–47)
HGB BLD-MCNC: 7.2 G/DL (ref 11.5–16)
IMM GRANULOCYTES # BLD AUTO: 0.1 K/UL (ref 0–0.04)
IMM GRANULOCYTES NFR BLD AUTO: 1 % (ref 0–0.5)
LYMPHOCYTES # BLD: 1.7 K/UL (ref 0.8–3.5)
LYMPHOCYTES NFR BLD: 21 % (ref 12–49)
MCH RBC QN AUTO: 31.7 PG (ref 26–34)
MCHC RBC AUTO-ENTMCNC: 36 G/DL (ref 30–36.5)
MCV RBC AUTO: 88.1 FL (ref 80–99)
MONOCYTES # BLD: 0.5 K/UL (ref 0–1)
MONOCYTES NFR BLD: 6 % (ref 5–13)
NEUTS SEG # BLD: 5.9 K/UL (ref 1.8–8)
NEUTS SEG NFR BLD: 71 % (ref 32–75)
NRBC # BLD: 0 K/UL (ref 0–0.01)
NRBC BLD-RTO: 0 PER 100 WBC
PLATELET # BLD AUTO: 141 K/UL (ref 150–400)
PMV BLD AUTO: 10.6 FL (ref 8.9–12.9)
RBC # BLD AUTO: 2.27 M/UL (ref 3.8–5.2)
WBC # BLD AUTO: 8.3 K/UL (ref 3.6–11)

## 2024-01-27 PROCEDURE — 2580000003 HC RX 258: Performed by: OBSTETRICS & GYNECOLOGY

## 2024-01-27 PROCEDURE — 88307 TISSUE EXAM BY PATHOLOGIST: CPT

## 2024-01-27 PROCEDURE — 6370000000 HC RX 637 (ALT 250 FOR IP): Performed by: OBSTETRICS & GYNECOLOGY

## 2024-01-27 PROCEDURE — 36415 COLL VENOUS BLD VENIPUNCTURE: CPT

## 2024-01-27 PROCEDURE — 6360000002 HC RX W HCPCS: Performed by: OBSTETRICS & GYNECOLOGY

## 2024-01-27 PROCEDURE — 88309 TISSUE EXAM BY PATHOLOGIST: CPT

## 2024-01-27 PROCEDURE — 85025 COMPLETE CBC W/AUTO DIFF WBC: CPT

## 2024-01-27 RX ADMIN — IBUPROFEN 800 MG: 400 TABLET, FILM COATED ORAL at 12:05

## 2024-01-27 RX ADMIN — AMPICILLIN SODIUM 2000 MG: 2 INJECTION, POWDER, FOR SOLUTION INTRAVENOUS at 06:00

## 2024-01-27 ASSESSMENT — PAIN SCALES - GENERAL: PAINLEVEL_OUTOF10: 4

## 2024-01-27 ASSESSMENT — PAIN DESCRIPTION - LOCATION: LOCATION: ABDOMEN

## 2024-01-27 NOTE — PROGRESS NOTES
1945 Bedside and Verbal shift change report given to GÓMEZ Kamara RN by STEVEN Holland RN. Report included the following information Nurse Handoff Report, Intake/Output, and MAR.      2330 Patient up to bedside commode with assistance. Patient states that her right leg still \"feels funny\". Patient denies feeling dizzy or lightheaded. Voided large amount. Small to scant lochia. Miladis care complete. Patient instructed to call for assistance getting out of bed. Patient verbalized understanding.

## 2024-01-27 NOTE — DISCHARGE SUMMARY
Discharge Summary    Date: 2024  Patient Name: Valeri Kwon    YOB: 2005     Age: 19 y.o.    Admit Date: 2024  Discharge Date: 2024  Discharge Condition:    Admission Diagnosis  18 weeks gestation of pregnancy [Z3A.18];PROM (premature rupture of membranes) [O42.90]      Discharge Diagnosis  Principal Problem:    PROM (premature rupture of membranes)  Active Problems:    18 weeks gestation of pregnancy    Fetal demise affecting delivery  Resolved Problems:    * No resolved hospital problems. *      Hospital Stay  Narrative of Hospital Course:  Pt admitted @ 18 3/7 wks with PPROM and vaginal bleeding mono/mono vs mono/di twins.  Pregnancy c/b suspected TTTS.  Pt seen by MFM on HD#1 and fetal demise x 2 was discovered.  She underwent an IOL with cytotec and had an uncomplicated delivery of fetuses and placenta.  She had appropriate VB associated with the delivery but had anemia, asymptomatic presumably from pre-delivery VB.  Pt was grieving with good support from her partner and family.    Consultants:  None    Surgeries/procedures Performed:       Treatments:            Discharge Plan/Disposition:  Home    Hospital/Incidental Findings Requiring Follow Up:    Patient Instructions:    Diet:    Activity:Activity as Tolerated and No Sex for  For number of days (if applicable):      Other Instructions: Pelvic rest x 4 weeks  Cont PNV  Begin PO iron    Provider Follow-Up:   No follow-ups on file.     Significant Diagnostic Studies:    Recent Labs:  Admission on 2024, Discharged on 2024  WBC                                           Date: 2024  Value: 8.3         Ref range: 3.6 - 11.0 K/uL    Status: Final  RBC                                           Date: 2024  Value: 2.27 (L)    Ref range: 3.80 - 5.20 M/uL   Status: Final  Hemoglobin                                    Date: 2024  Value: 7.2 (L)     Ref range: 11.5 - 16.0 g/dL   Status: Final                    Status: Final  Neutrophils Absolute                          Date: 01/27/2024  Value: 5.9         Ref range: 1.8 - 8.0 K/UL     Status: Final  Lymphocytes Absolute                          Date: 01/27/2024  Value: 1.7         Ref range: 0.8 - 3.5 K/UL     Status: Final  Monocytes Absolute                            Date: 01/27/2024  Value: 0.5         Ref range: 0.0 - 1.0 K/UL     Status: Final  Eosinophils Absolute                          Date: 01/27/2024  Value: 0.1         Ref range: 0.0 - 0.4 K/UL     Status: Final  Basophils Absolute                            Date: 01/27/2024  Value: 0.0         Ref range: 0.0 - 0.1 K/UL     Status: Final  Absolute Immature Granulocyte                 Date: 01/27/2024  Value: 0.1 (H)     Ref range: 0.00 - 0.04 K/UL   Status: Final  Differential Type                             Date: 01/27/2024  Value: AUTOMATED   Ref range:                    Status: Final  ------------    Radiology last 7 days:  No results found.     [unfilled]    Discharge Medications    Discharge Medication List as of 1/27/2024 12:00 PM        Discharge Medication List as of 1/27/2024 12:00 PM        Discharge Medication List as of 1/27/2024 12:00 PM    CONTINUE these medications which have NOT CHANGED    lidocaine (LIDODERM) 5 %  Place 1 patch onto the skin daily for 10 days 12 hours on, 12 hours off., Disp-10 patch, R-0  Normal    Prenatal Vit-Fe Fumarate-FA (PRENATAL PO)  Take by mouth  Historical Med    aspirin 81 MG EC tablet  Take 1 tablet by mouth daily, Disp-90 tablet, R-2  Normal    pyridoxine (RA VITAMIN B-6) 50 MG tablet  Take 0.5 tablets by mouth daily, Disp-30 tablet, R-1  Normal    doxyLAMINE succinate (GNP SLEEP AID) 25 MG tablet  Take 0.5 tablets by mouth nightly, Disp-14 tablet, R-2  Normal          Discharge Medication List as of 1/27/2024 12:00 PM        Time Spent on Discharge:  minutes were spent in patient examination, evaluation, counseling as well as medication reconciliation,

## 2024-01-27 NOTE — PROGRESS NOTES
0730- Bedside shift change report given to Grisel Holland RN (oncoming nurse) by Kirti Astudillo RN (offgoing nurse). Report included the following information Nurse Handoff Report, Intake/Output, MAR, and Recent Results.     0830- Pt sleeping. Partner at bedside.    0930- Pt sleeping. Partner at bedside.    1030- Pt sleeping. Partner at bedside.    1148- Dr Arita at bedside.    1205- Discharge instructions given and discussed with patient and spouse with . All questions answered.    1225- Pt stable for discharge. Escorted off unit in wheelchair.

## 2024-01-27 NOTE — PROGRESS NOTES
In to meet pt and partner.  She is PPD 1 s/p  of 18 week twin demises.  She states she is overall feeling well, denies any dizziness or heavy VB.    She and her partner are reviewing with Grisel, their nurse, burial plans for Jordan and Yevgeniy.  Hgb 11.7 --> 7.2  AFVSS  NAD    Discharge home  Pt has an appt with AUGUSTINE on , advised to keep that as a grief and bleeding check.  Bleeding precautions reviewed with pt.  Advised OTC iron and continued PNV as pt plans to try again soon.  Condolences ordered.   used.

## 2024-01-30 ENCOUNTER — OFFICE VISIT (OUTPATIENT)
Age: 19
End: 2024-01-30
Payer: MEDICAID

## 2024-01-30 VITALS — BODY MASS INDEX: 24.33 KG/M2 | SYSTOLIC BLOOD PRESSURE: 142 MMHG | WEIGHT: 157 LBS | DIASTOLIC BLOOD PRESSURE: 87 MMHG

## 2024-01-30 DIAGNOSIS — D62 ANEMIA DUE TO ACUTE BLOOD LOSS: Primary | ICD-10-CM

## 2024-01-30 PROCEDURE — 99213 OFFICE O/P EST LOW 20 MIN: CPT | Performed by: STUDENT IN AN ORGANIZED HEALTH CARE EDUCATION/TRAINING PROGRAM

## 2024-01-30 RX ORDER — FERROUS SULFATE 325(65) MG
325 TABLET ORAL
Qty: 90 TABLET | Refills: 1 | Status: SHIPPED | OUTPATIENT
Start: 2024-01-30

## 2024-01-30 NOTE — PROGRESS NOTES
OB/GYN Problem VIsit    HPI  Valeri Kwon is a ,  19 y.o. female who presents for a problem visit.     PPD4 s/p  of twin gestation at 18w3d. Pregnancy was c/b mono/mono vs mono/di twin gestation c/b TTTS. Came to [facility]  with vaginal bleeding and fetus' passed during that day, she was ultimately induced with misoprostol and delivered without issue.    Bleeding since discharge has been minimal. She was d/c'd with a hgb 7.2. She notes ongoing dizziness when she walks around. Denies CP, palpitations, SOB.    Denies F/C, pudulent discharge.     Notes some bilateral breast fullness and milky discharge.     History reviewed. No pertinent past medical history.  History reviewed. No pertinent surgical history.  Social History     Occupational History    Not on file   Tobacco Use    Smoking status: Never     Passive exposure: Never    Smokeless tobacco: Never   Vaping Use    Vaping Use: Former    Substances: Flavoring   Substance and Sexual Activity    Alcohol use: Not Currently    Drug use: Never    Sexual activity: Yes     Partners: Male     History reviewed. No pertinent family history.    No Known Allergies  Prior to Admission medications    Medication Sig Start Date End Date Taking? Authorizing Provider   ferrous sulfate (IRON 325) 325 (65 Fe) MG tablet Take 1 tablet by mouth daily (with breakfast) 24  Yes Heavenly Ventura MD   Prenatal Vit-Fe Fumarate-FA (PRENATAL PO) Take by mouth  Patient not taking: Reported on 2024    ProviderSal MD        Review of Systems: History obtained from the patient  Constitutional: negative for weight loss, fever, night sweats  Breast: negative for breast lumps, nipple discharge, galactorrhea  GI: negative for change in bowel habits, abdominal pain, black or bloody stools  : negative for frequency, dysuria, hematuria, vaginal discharge  MSK: negative for back pain, joint pain, muscle pain  Skin: negative for itching, rash, hives  Psych: negative for anxiety, 
Valeri Kwon is a 19 y.o. female presents for a problem visit.    Chief Complaint   Patient presents with    ED Follow-up     Patient's last menstrual period was 09/19/2023.  Birth Control: none.  Last Pap: not obtained.    The patient is here for an ER fu d/t having a miscarriage.  Since being in the ER, she has no new concerns.   Pt states she's still bleeding  No cramping associated.         1. Have you been to the ER, urgent care clinic, or hospitalized since your last visit? Yes    2. Have you seen or consulted any other health care providers outside of the [facility] since your last visit? No    Examination chaperoned by Kassy Torres MA.  
negative...

## 2024-01-31 LAB
ERYTHROCYTE [DISTWIDTH] IN BLOOD BY AUTOMATED COUNT: 15.5 % (ref 11.5–14.5)
HCT VFR BLD AUTO: 26.5 % (ref 35–47)
HGB BLD-MCNC: 8.8 G/DL (ref 11.5–16)
MCH RBC QN AUTO: 31.1 PG (ref 26–34)
MCHC RBC AUTO-ENTMCNC: 33.2 G/DL (ref 30–36.5)
MCV RBC AUTO: 93.6 FL (ref 80–99)
NRBC # BLD: 0 K/UL (ref 0–0.01)
NRBC BLD-RTO: 0 PER 100 WBC
PLATELET # BLD AUTO: 338 K/UL (ref 150–400)
PMV BLD AUTO: 10.5 FL (ref 8.9–12.9)
RBC # BLD AUTO: 2.83 M/UL (ref 3.8–5.2)
WBC # BLD AUTO: 7.4 K/UL (ref 3.6–11)

## 2024-02-01 LAB
Lab: NORMAL
NTRA 22Q11.2 DELETION SYNDROME POPULATION-BASED RISK TEXT: NORMAL
NTRA 22Q11.2 DELETION SYNDROME RESULT TEXT: NORMAL
NTRA 22Q11.2 DELETION SYNDROME RISK SCORE TEXT: NORMAL
NTRA FETAL FRACTION: NORMAL
NTRA GENDER OF FETUS: NORMAL
NTRA GENDER OF SECOND FETUS: NORMAL
NTRA MONOSOMY X AGE-BASED RISK TEXT: NORMAL
NTRA MONOSOMY X RESULT TEXT: NORMAL
NTRA MONOSOMY X RISK SCORE TEXT: NORMAL
NTRA TRISOMY 13 AGE-BASED RISK TEXT: NORMAL
NTRA TRISOMY 13 RESULT TEXT: NORMAL
NTRA TRISOMY 13 RISK SCORE TEXT: NORMAL
NTRA TRISOMY 18 AGE-BASED RISK TEXT: NORMAL
NTRA TRISOMY 18 RESULT TEXT: NORMAL
NTRA TRISOMY 18 RISK SCORE TEXT: NORMAL
NTRA TRISOMY 21 AGE-BASED RISK TEXT: NORMAL
NTRA TRISOMY 21 RESULT TEXT: NORMAL
NTRA TRISOMY 21 RISK SCORE TEXT: NORMAL
NTRA ZYGOSITY: NORMAL

## 2024-02-04 LAB
EGFR GENE MUT TESTED BLD/T: NEGATIVE
KRAS GENE MUT ANL BLD/T: NEGATIVE
Lab: NEGATIVE
Lab: NORMAL
MICROARRAY PLATFORM: NEGATIVE
NTRA ALPHA-THALASSEMIA: NEGATIVE
NTRA BATTEN DISEASE (NEURONAL CEROID LIPOFUSCINOSIS, CLN3-RELATED): NEGATIVE
NTRA BETA-HEMOGLOBINOPATHIES: NEGATIVE
NTRA BLOOM SYNDROME: NEGATIVE
NTRA CANAVAN DISEASE: NEGATIVE
NTRA CITRULLINEMIA, TYPE I: NEGATIVE
NTRA CYSTIC FIBROSIS: NEGATIVE
NTRA DUCHENNE/BECKER MUSCULAR DYSTROPHY: NEGATIVE
NTRA FAMILIAL DYSAUTONOMIA: NEGATIVE
NTRA FANCONI ANEMIA, GROUP C: NEGATIVE
NTRA FRAGILE X SYNDROME: NEGATIVE
NTRA GALACTOSEMIA: NEGATIVE
NTRA GAUCHER DISEASE: NEGATIVE
NTRA GLYCOGEN STORAGE DISEASE, TYPE 1A: NEGATIVE
NTRA ISOVALERIC ACIDEMIA: NEGATIVE
NTRA MEDIUM CHAIN ACYL-COA DEHYDROGENASE DEFICIENCY: NEGATIVE
NTRA METHYLMALONIC ACIDURIA AND HOMOCYSTINURIA, TYPE CBLC: NEGATIVE
NTRA MUCOLIPIDOSIS, TYPE IV: NEGATIVE
NTRA POLYCYSTIC KIDNEY DISEASE, AUTOSOMAL RECESSIVE: NEGATIVE
NTRA SMITH-LEMLI-OPITZ SYNDROME: NEGATIVE
NTRA SPINAL MUSCULAR ATROPHY: NEGATIVE
NTRA TAY-SACHS DISEASE: NEGATIVE
NTRA TYROSINEMIA, TYPE I: NEGATIVE
NTRA ZELLWEGER SPECTRUM DISORDERS, PEX1-RELATED: NEGATIVE

## 2024-02-05 ENCOUNTER — TELEPHONE (OUTPATIENT)
Age: 19
End: 2024-02-05

## 2024-02-05 NOTE — TELEPHONE ENCOUNTER
Two patient identifiers used    29 year old patient last seen in the office on 1/30/2024 for anemia    Rika from [facility] calling to confirm patients MD to send results too    This nurse confirmed MD

## 2024-02-17 ENCOUNTER — APPOINTMENT (OUTPATIENT)
Facility: HOSPITAL | Age: 19
End: 2024-02-17
Payer: MEDICAID

## 2024-02-17 ENCOUNTER — HOSPITAL ENCOUNTER (EMERGENCY)
Facility: HOSPITAL | Age: 19
Discharge: HOME | End: 2024-02-17
Attending: STUDENT IN AN ORGANIZED HEALTH CARE EDUCATION/TRAINING PROGRAM
Payer: MEDICAID

## 2024-02-17 VITALS
RESPIRATION RATE: 26 BRPM | HEART RATE: 79 BPM | OXYGEN SATURATION: 100 % | SYSTOLIC BLOOD PRESSURE: 131 MMHG | BODY MASS INDEX: 23.75 KG/M2 | DIASTOLIC BLOOD PRESSURE: 82 MMHG | WEIGHT: 153.22 LBS | TEMPERATURE: 99.1 F

## 2024-02-17 DIAGNOSIS — R06.02 SHORTNESS OF BREATH: Primary | ICD-10-CM

## 2024-02-17 LAB
ALBUMIN SERPL-MCNC: 4.6 G/DL (ref 3.5–5)
ALBUMIN/GLOB SERPL: 1 (ref 1.1–2.2)
ALP SERPL-CCNC: 75 U/L (ref 45–117)
ALT SERPL-CCNC: 52 U/L (ref 12–78)
ANION GAP SERPL CALC-SCNC: 8 MMOL/L (ref 5–15)
APPEARANCE UR: CLEAR
APTT PPP: 25.5 SEC (ref 22.1–31)
AST SERPL-CCNC: 30 U/L (ref 15–37)
BACTERIA URNS QL MICRO: ABNORMAL /HPF
BASOPHILS # BLD: 0 K/UL (ref 0–0.1)
BASOPHILS NFR BLD: 0 % (ref 0–1)
BILIRUB SERPL-MCNC: 0.3 MG/DL (ref 0.2–1)
BILIRUB UR QL: NEGATIVE
BUN SERPL-MCNC: 10 MG/DL (ref 6–20)
BUN/CREAT SERPL: 14 (ref 12–20)
CALCIUM SERPL-MCNC: 10.2 MG/DL (ref 8.5–10.1)
CHLORIDE SERPL-SCNC: 106 MMOL/L (ref 97–108)
CO2 SERPL-SCNC: 23 MMOL/L (ref 21–32)
COLOR UR: ABNORMAL
COMMENT:: NORMAL
CREAT SERPL-MCNC: 0.73 MG/DL (ref 0.55–1.02)
D DIMER PPP FEU-MCNC: 0.41 MG/L FEU (ref 0–0.65)
DIFFERENTIAL METHOD BLD: ABNORMAL
EOSINOPHIL # BLD: 0.1 K/UL (ref 0–0.4)
EOSINOPHIL NFR BLD: 1 % (ref 0–7)
EPITH CASTS URNS QL MICRO: ABNORMAL /LPF
ERYTHROCYTE [DISTWIDTH] IN BLOOD BY AUTOMATED COUNT: 12.1 % (ref 11.5–14.5)
FIBRINOGEN PPP-MCNC: 345 MG/DL (ref 200–475)
GLOBULIN SER CALC-MCNC: 4.6 G/DL (ref 2–4)
GLUCOSE SERPL-MCNC: 97 MG/DL (ref 65–100)
GLUCOSE UR STRIP.AUTO-MCNC: NEGATIVE MG/DL
HCG SERPL QL: POSITIVE
HCG SERPL-ACNC: 19 MIU/ML (ref 0–6)
HCT VFR BLD AUTO: 39.8 % (ref 35–47)
HGB BLD-MCNC: 13.1 G/DL (ref 11.5–16)
HGB UR QL STRIP: ABNORMAL
HYALINE CASTS URNS QL MICRO: ABNORMAL /LPF (ref 0–5)
IMM GRANULOCYTES # BLD AUTO: 0 K/UL (ref 0–0.04)
IMM GRANULOCYTES NFR BLD AUTO: 0 % (ref 0–0.5)
INR PPP: 1 (ref 0.9–1.1)
KETONES UR QL STRIP.AUTO: NEGATIVE MG/DL
LEUKOCYTE ESTERASE UR QL STRIP.AUTO: ABNORMAL
LYMPHOCYTES # BLD: 1.5 K/UL (ref 0.8–3.5)
LYMPHOCYTES NFR BLD: 19 % (ref 12–49)
MAGNESIUM SERPL-MCNC: 1.9 MG/DL (ref 1.6–2.4)
MCH RBC QN AUTO: 30.1 PG (ref 26–34)
MCHC RBC AUTO-ENTMCNC: 32.9 G/DL (ref 30–36.5)
MCV RBC AUTO: 91.5 FL (ref 80–99)
MONOCYTES # BLD: 0.3 K/UL (ref 0–1)
MONOCYTES NFR BLD: 4 % (ref 5–13)
NEUTS SEG # BLD: 6.1 K/UL (ref 1.8–8)
NEUTS SEG NFR BLD: 76 % (ref 32–75)
NITRITE UR QL STRIP.AUTO: NEGATIVE
NRBC # BLD: 0 K/UL (ref 0–0.01)
NRBC BLD-RTO: 0 PER 100 WBC
PH UR STRIP: 5 (ref 5–8)
PHOSPHATE SERPL-MCNC: 4.2 MG/DL (ref 2.6–4.7)
PLATELET # BLD AUTO: 369 K/UL (ref 150–400)
POTASSIUM SERPL-SCNC: 3.9 MMOL/L (ref 3.5–5.1)
PROT SERPL-MCNC: 9.2 G/DL (ref 6.4–8.2)
PROT UR STRIP-MCNC: NEGATIVE MG/DL
PROTHROMBIN TIME: 10.8 SEC (ref 9–11.1)
RBC # BLD AUTO: 4.35 M/UL (ref 3.8–5.2)
RBC #/AREA URNS HPF: ABNORMAL /HPF (ref 0–5)
RBC MORPH BLD: ABNORMAL
SODIUM SERPL-SCNC: 137 MMOL/L (ref 136–145)
SP GR UR REFRACTOMETRY: 1.01 (ref 1–1.03)
SPECIMEN HOLD: NORMAL
SPECIMEN HOLD: NORMAL
THERAPEUTIC RANGE: NORMAL SECS (ref 58–77)
UROBILINOGEN UR QL STRIP.AUTO: 0.2 EU/DL (ref 0.2–1)
WBC # BLD AUTO: 8 K/UL (ref 3.6–11)
WBC URNS QL MICRO: ABNORMAL /HPF (ref 0–4)

## 2024-02-17 PROCEDURE — 80053 COMPREHEN METABOLIC PANEL: CPT

## 2024-02-17 PROCEDURE — 83735 ASSAY OF MAGNESIUM: CPT

## 2024-02-17 PROCEDURE — 85610 PROTHROMBIN TIME: CPT

## 2024-02-17 PROCEDURE — 84703 CHORIONIC GONADOTROPIN ASSAY: CPT

## 2024-02-17 PROCEDURE — 71046 X-RAY EXAM CHEST 2 VIEWS: CPT

## 2024-02-17 PROCEDURE — 2580000003 HC RX 258: Performed by: STUDENT IN AN ORGANIZED HEALTH CARE EDUCATION/TRAINING PROGRAM

## 2024-02-17 PROCEDURE — 84100 ASSAY OF PHOSPHORUS: CPT

## 2024-02-17 PROCEDURE — 85384 FIBRINOGEN ACTIVITY: CPT

## 2024-02-17 PROCEDURE — 99285 EMERGENCY DEPT VISIT HI MDM: CPT

## 2024-02-17 PROCEDURE — 76856 US EXAM PELVIC COMPLETE: CPT

## 2024-02-17 PROCEDURE — 85730 THROMBOPLASTIN TIME PARTIAL: CPT

## 2024-02-17 PROCEDURE — 87086 URINE CULTURE/COLONY COUNT: CPT

## 2024-02-17 PROCEDURE — 85379 FIBRIN DEGRADATION QUANT: CPT

## 2024-02-17 PROCEDURE — 93005 ELECTROCARDIOGRAM TRACING: CPT | Performed by: STUDENT IN AN ORGANIZED HEALTH CARE EDUCATION/TRAINING PROGRAM

## 2024-02-17 PROCEDURE — 36415 COLL VENOUS BLD VENIPUNCTURE: CPT

## 2024-02-17 PROCEDURE — 76830 TRANSVAGINAL US NON-OB: CPT

## 2024-02-17 PROCEDURE — 84702 CHORIONIC GONADOTROPIN TEST: CPT

## 2024-02-17 PROCEDURE — 81001 URINALYSIS AUTO W/SCOPE: CPT

## 2024-02-17 PROCEDURE — 85025 COMPLETE CBC W/AUTO DIFF WBC: CPT

## 2024-02-17 RX ORDER — 0.9 % SODIUM CHLORIDE 0.9 %
1000 INTRAVENOUS SOLUTION INTRAVENOUS ONCE
Status: COMPLETED | OUTPATIENT
Start: 2024-02-17 | End: 2024-02-17

## 2024-02-17 RX ADMIN — SODIUM CHLORIDE 1000 ML: 9 INJECTION, SOLUTION INTRAVENOUS at 18:46

## 2024-02-17 ASSESSMENT — PAIN DESCRIPTION - DIRECTION: RADIATING_TOWARDS: L ARM

## 2024-02-17 ASSESSMENT — PAIN DESCRIPTION - LOCATION: LOCATION: CHEST

## 2024-02-17 ASSESSMENT — PAIN DESCRIPTION - DESCRIPTORS: DESCRIPTORS: PRESSURE

## 2024-02-17 ASSESSMENT — PAIN SCALES - GENERAL: PAINLEVEL_OUTOF10: 8

## 2024-02-17 ASSESSMENT — PAIN DESCRIPTION - ORIENTATION: ORIENTATION: LEFT

## 2024-02-17 NOTE — ED NOTES
IV successfully placed. Lab work collected and sent to lab via tube station. Urine sample collected and sent to lab via tube station. Pt. Remains on cardiac/respiratory monitor. Updated on plan of care.

## 2024-02-17 NOTE — ED PROVIDER NOTES
[facility] PEDIATRIC EMR DEPT  EMERGENCY DEPARTMENT ENCOUNTER      Pt Name: Valeri Kwon  MRN: [id number]  Birthdate 2005  Date of evaluation: 2/17/2024  Provider: Beverly Mccormick DO    CHIEF COMPLAINT       Chief Complaint   Patient presents with    Chest Pain    Shortness of Breath         HISTORY OF PRESENT ILLNESS   (Location/Symptom, Timing/Onset, Context/Setting, Quality, Duration, Modifying Factors, Severity)  Note limiting factors.   Patient is a 18 yo F presenting with chest pain and trouble breathing. Symptoms started 4 hours ago. Had an episode of syncope just prior to arrival. Boyfriend called EMS and brought patient to ED. Patient has been having a fast heart beat for the past week. Patient also reports that she went into labor 2 weeks ago- her water broke. Patient was 18 weeks pregnant with twins (mono/mono) at the time. Patient had fetal demise for both babies, suspected TTTS. Patient is still bleeding. Goes through 2 pads daily, does not pass large clots. Denies hormonal therapy. Only takes motrin and prenatal vitamins and vitamin B6.     The history is provided by the patient and a friend. The history is limited by a language barrier. A  was used.         Review of External Medical Records:     Nursing Notes were reviewed.    REVIEW OF SYSTEMS    (2-9 systems for level 4, 10 or more for level 5)     Review of Systems   Constitutional:  Negative for fever.   HENT:  Negative for sore throat.    Respiratory:  Positive for shortness of breath. Negative for cough and wheezing.    Cardiovascular:  Positive for chest pain.   Gastrointestinal:  Negative for abdominal pain, constipation, diarrhea and vomiting.   Genitourinary:  Positive for vaginal bleeding. Negative for decreased urine volume, dysuria, pelvic pain, vaginal discharge and vaginal pain.   Musculoskeletal:  Negative for back pain and gait problem.   Skin:  Negative for rash.   Neurological:  Negative for dizziness and  1715]   BP Temp Temp Source Pulse Respirations SpO2 Height Weight - Scale   127/88 99.1 °F (37.3 °C) Oral 85 16 100 % -- 69.5 kg (153 lb 3.5 oz)       Body mass index is 23.75 kg/m².    Physical Exam  Vitals and nursing note reviewed.   Constitutional:       Appearance: She is well-developed.   HENT:      Head: Normocephalic.      Right Ear: External ear normal.      Left Ear: External ear normal.      Nose: Nose normal.      Mouth/Throat:      Mouth: Mucous membranes are moist.      Pharynx: No pharyngeal swelling or oropharyngeal exudate.   Eyes:      Extraocular Movements: Extraocular movements intact.      Conjunctiva/sclera: Conjunctivae normal.      Pupils: Pupils are equal, round, and reactive to light.   Cardiovascular:      Rate and Rhythm: Normal rate and regular rhythm.      Pulses: Normal pulses.      Heart sounds: Normal heart sounds. No murmur heard.  Pulmonary:      Effort: Pulmonary effort is normal. No respiratory distress.      Breath sounds: Normal breath sounds. No wheezing or rales.   Abdominal:      General: Abdomen is flat. There is no distension.      Palpations: Abdomen is soft.      Tenderness: There is no abdominal tenderness.   Musculoskeletal:      Cervical back: Normal range of motion and neck supple.   Skin:     General: Skin is warm.      Capillary Refill: Capillary refill takes less than 2 seconds.      Findings: No rash.   Neurological:      General: No focal deficit present.      Mental Status: She is alert.         DIAGNOSTIC RESULTS     EKG: All EKG's are interpreted by the Emergency Department Physician who either signs or Co-signs this chart in the absence of a cardiologist.        RADIOLOGY:   Non-plain film images such as CT, Ultrasound and MRI are read by the radiologist. Plain radiographic images are visualized and preliminarily interpreted by the emergency physician with the below findings:        Interpretation per the Radiologist below, if available at the time of this  Shortness of breath          DISPOSITION/PLAN   DISPOSITION Decision To Discharge 02/17/2024 08:47:58 PM      PATIENT REFERRED TO:  Kit Sweeney MD  [address]  [address]  [facility] [address] [phone]    In 2 days      [facility] PEDIATRIC EMR DEPT  [address] [facility] [address] [phone]    If symptoms worsen    Heavenly Ventura MD  [address] [address] [id number] [phone]            DISCHARGE MEDICATIONS:  New Prescriptions    No medications on file         (Please note that portions of this note were completed with a voice recognition program.  Efforts were made to edit the dictations but occasionally words are mis-transcribed.)    Beverly Mccormick DO (electronically signed)  Emergency Attending Physician / Physician Assistant / Nurse Practitioner              Beverly Mccormick DO  02/17/24 2051

## 2024-02-17 NOTE — ED TRIAGE NOTES
Pt arrives via EMS. Pt was reportedly on the way to the hospital with boyfriend for rapid heart beat and feeling like she couldn't breathe. Pt then had syncopal episode and boyfriend called EMS. Pt with multiple episodes of rapid heart rate over the last week. Pt with recent miscarriage in the last two weeks. Pt states she lost a lot of blood during miscarriage. Last motrin at 11AM for abdominal pain.

## 2024-02-18 LAB
BACTERIA SPEC CULT: NORMAL
CC UR VC: NORMAL
EKG ATRIAL RATE: 98 BPM
EKG DIAGNOSIS: NORMAL
EKG P AXIS: 26 DEGREES
EKG P-R INTERVAL: 150 MS
EKG Q-T INTERVAL: 356 MS
EKG QRS DURATION: 74 MS
EKG QTC CALCULATION (BAZETT): 454 MS
EKG R AXIS: 49 DEGREES
EKG T AXIS: 47 DEGREES
EKG VENTRICULAR RATE: 98 BPM
SERVICE CMNT-IMP: NORMAL

## 2024-02-18 NOTE — ED NOTES
Verbal/bedside report given to Cecelia PISANO RN. Report included SBAR, ED summary, vitals, and lab/diagnostic results.

## 2024-02-18 NOTE — ED NOTES
Pt discharged home with parent/guardian. Pt acting age appropriately, respirations regular and unlabored, cap refill less than two seconds. Skin pink, dry and warm. Lungs clear bilaterally. No further complaints at this time. Parent/guardian verbalized understanding of discharge paperwork and has no further questions at this time.    Education provided about continuation of care, follow up care with OBGYN and medication administration. Parent/guardian able to provide teach back about discharge instructions.

## 2024-02-18 NOTE — ED NOTES
Pt resting on stretcher on cardiac monitor x3. Blood work obtained off of PIV. Pt tolerated well and resting on stretcher post blood draw. No needs expressed at this time.

## 2024-02-20 ENCOUNTER — TELEPHONE (OUTPATIENT)
Age: 19
End: 2024-02-20

## 2024-02-20 NOTE — TELEPHONE ENCOUNTER
Two patient identifiers used    19 year old patient last seen in the office on 2024      Tracie Novoa calling to say that they need a form to be filled out to be able to send the anora specimen to the requested  home      Fax number confirmed

## 2024-03-12 ENCOUNTER — OFFICE VISIT (OUTPATIENT)
Age: 19
End: 2024-03-12
Payer: MEDICAID

## 2024-03-12 VITALS
SYSTOLIC BLOOD PRESSURE: 114 MMHG | DIASTOLIC BLOOD PRESSURE: 63 MMHG | WEIGHT: 163 LBS | BODY MASS INDEX: 25.26 KG/M2 | HEART RATE: 84 BPM

## 2024-03-12 DIAGNOSIS — O03.9 SPONTANEOUS MISCARRIAGE: ICD-10-CM

## 2024-03-12 PROCEDURE — 99213 OFFICE O/P EST LOW 20 MIN: CPT | Performed by: STUDENT IN AN ORGANIZED HEALTH CARE EDUCATION/TRAINING PROGRAM

## 2024-03-13 NOTE — PROGRESS NOTES
Valeri Kwon is a 19 y.o. female presents for a problem visit.    Chief Complaint   Patient presents with    Follow-up     Patient's last menstrual period was 09/19/2023.  Birth Control: none.  Last Pap: not obtained.    The patient is here for a fu d/t having a recent SAB.  Since her last visit, she has stopped bleeding. She states she had some spotting 2 days ago but that has subsided.   The patient now has c/o anxiety, BP elevations, face numbness and headaches.               Examination chaperoned by Kassy Torres MA.  
negative for itching, rash, hives  Psych: negative for anxiety, depression, change in mood      Objective:  /63   Pulse 84   Wt 73.9 kg (163 lb)   LMP 2023   Breastfeeding No   BMI 25.26 kg/m²     Physical Exam:   PHYSICAL EXAMINATION    Constitutional  Appearance: well-nourished, well developed, alert, in no acute distress      Gastrointestinal  Abdominal Examination: abdomen non-tender to palpation, normal bowel sounds, no masses present  Liver and spleen: no hepatomegaly present, spleen not palpable  Hernias: no hernias identified    Skin  General Inspection: no rash, no lesions identified    Neurologic/Psychiatric  Mental Status:  Orientation: grossly oriented to person, place and time  Mood and Affect: mood normal, affect appropriate      ASSESSMENT:    ICD-10-CM    1. Postpartum depression  F53.0 sertraline (ZOLOFT) 50 MG tablet          PLAN:  Orders Placed This Encounter    sertraline (ZOLOFT) 50 MG tablet     Sig: Take 1 tablet by mouth daily     Dispense:  90 tablet     Refill:  1     20yo  6w s/p 18w miscarriage of twin gestation.  Recovering well physically.  Having some difficulty with anxiety and mental health. Partner supportive. Interested in medical management, rx'd zoloft and given precautions. Denies SI/HI today. Given information on counselor and encouraged she establish care with PCP to follow this and other concerns.  Declines contraception today as she and her partner are very interested in conceiving another pregnancy soon. Discussed that her situation is unique and the ideal interpregnancy interval to minimize her risk and the risk to the pregnancy (specifically wrt PTB) is unknown. Discussed that most conservative interval would be 1.5 years to minimize risk.  RTO prn if symptoms persist or worsen.  Instructions given to pt.  Handouts given to pt.    MD [facility]

## 2024-08-05 NOTE — ED TRIAGE NOTES
Pt reports low back pain and low pelvic pain x 1 hour. Per friend intrpreting- pt took a + home preg test today also.   [facility] [phone]

## 2024-12-28 ENCOUNTER — APPOINTMENT (OUTPATIENT)
Facility: HOSPITAL | Age: 19
End: 2024-12-28
Payer: MEDICAID

## 2024-12-28 ENCOUNTER — HOSPITAL ENCOUNTER (EMERGENCY)
Facility: HOSPITAL | Age: 19
Discharge: HOME | End: 2024-12-28
Attending: EMERGENCY MEDICINE
Payer: MEDICAID

## 2024-12-28 VITALS
BODY MASS INDEX: 27.79 KG/M2 | TEMPERATURE: 98.4 F | RESPIRATION RATE: 16 BRPM | HEART RATE: 71 BPM | OXYGEN SATURATION: 100 % | DIASTOLIC BLOOD PRESSURE: 77 MMHG | SYSTOLIC BLOOD PRESSURE: 118 MMHG | HEIGHT: 67 IN | WEIGHT: 177.03 LBS

## 2024-12-28 DIAGNOSIS — R07.89 ATYPICAL CHEST PAIN: Primary | ICD-10-CM

## 2024-12-28 LAB
COMMENT:: NORMAL
HCG UR QL: NEGATIVE
SPECIMEN HOLD: NORMAL

## 2024-12-28 PROCEDURE — 81025 URINE PREGNANCY TEST: CPT

## 2024-12-28 PROCEDURE — 36415 COLL VENOUS BLD VENIPUNCTURE: CPT

## 2024-12-28 PROCEDURE — 6370000000 HC RX 637 (ALT 250 FOR IP): Performed by: PHYSICIAN ASSISTANT

## 2024-12-28 PROCEDURE — 93005 ELECTROCARDIOGRAM TRACING: CPT | Performed by: EMERGENCY MEDICINE

## 2024-12-28 PROCEDURE — 99285 EMERGENCY DEPT VISIT HI MDM: CPT

## 2024-12-28 PROCEDURE — 71046 X-RAY EXAM CHEST 2 VIEWS: CPT

## 2024-12-28 RX ORDER — IBUPROFEN 400 MG/1
600 TABLET, FILM COATED ORAL
Status: COMPLETED | OUTPATIENT
Start: 2024-12-28 | End: 2024-12-28

## 2024-12-28 RX ORDER — IBUPROFEN 600 MG/1
600 TABLET, FILM COATED ORAL EVERY 6 HOURS PRN
Qty: 28 TABLET | Refills: 0 | Status: SHIPPED | OUTPATIENT
Start: 2024-12-28 | End: 2025-01-04

## 2024-12-28 RX ORDER — ACETAMINOPHEN 500 MG
1000 TABLET ORAL EVERY 6 HOURS PRN
Qty: 56 TABLET | Refills: 0 | Status: SHIPPED | OUTPATIENT
Start: 2024-12-28 | End: 2025-01-04

## 2024-12-28 RX ADMIN — IBUPROFEN 600 MG: 400 TABLET, FILM COATED ORAL at 17:39

## 2024-12-28 ASSESSMENT — PAIN DESCRIPTION - ORIENTATION
ORIENTATION: LEFT
ORIENTATION: MID

## 2024-12-28 ASSESSMENT — PAIN DESCRIPTION - LOCATION
LOCATION: CHEST
LOCATION: CHEST

## 2024-12-28 ASSESSMENT — PAIN SCALES - GENERAL
PAINLEVEL_OUTOF10: 10
PAINLEVEL_OUTOF10: 10

## 2024-12-28 ASSESSMENT — PAIN - FUNCTIONAL ASSESSMENT
PAIN_FUNCTIONAL_ASSESSMENT: ACTIVITIES ARE NOT PREVENTED
PAIN_FUNCTIONAL_ASSESSMENT: NONE - DENIES PAIN
PAIN_FUNCTIONAL_ASSESSMENT: ACTIVITIES ARE NOT PREVENTED
PAIN_FUNCTIONAL_ASSESSMENT: 0-10

## 2024-12-28 ASSESSMENT — PAIN DESCRIPTION - DESCRIPTORS
DESCRIPTORS: ACHING
DESCRIPTORS: SHOOTING;SHARP

## 2024-12-28 ASSESSMENT — PAIN DESCRIPTION - PAIN TYPE: TYPE: ACUTE PAIN

## 2024-12-28 NOTE — DISCHARGE INSTRUCTIONS
Call your primary care provider within 24 hours for close outpatient follow-up.  Continue to stretch as tolerated.  Take medication as prescribed.  Return immediately if any new or worsening symptoms.  Thank you for allowing us to be a part of your care.

## 2024-12-28 NOTE — ED TRIAGE NOTES
Patient in through triage with complaints of 10/10 left chest pain that radiates to her left arm.

## 2024-12-29 LAB
EKG ATRIAL RATE: 89 BPM
EKG DIAGNOSIS: NORMAL
EKG P AXIS: 40 DEGREES
EKG P-R INTERVAL: 146 MS
EKG Q-T INTERVAL: 364 MS
EKG QRS DURATION: 82 MS
EKG QTC CALCULATION (BAZETT): 442 MS
EKG R AXIS: 75 DEGREES
EKG T AXIS: 65 DEGREES
EKG VENTRICULAR RATE: 89 BPM

## 2024-12-29 PROCEDURE — 93010 ELECTROCARDIOGRAM REPORT: CPT | Performed by: INTERNAL MEDICINE

## 2024-12-31 ASSESSMENT — ENCOUNTER SYMPTOMS: COUGH: 1
